# Patient Record
Sex: FEMALE | Race: WHITE | NOT HISPANIC OR LATINO | Employment: FULL TIME | URBAN - METROPOLITAN AREA
[De-identification: names, ages, dates, MRNs, and addresses within clinical notes are randomized per-mention and may not be internally consistent; named-entity substitution may affect disease eponyms.]

---

## 2023-04-15 LAB
CHLAMYDIA TRACH., AMPLIFIED: NEGATIVE
N. GONORRHEA, AMPLIFIED: NEGATIVE
TRICHOMONAS VAGINALIS: NEGATIVE

## 2023-09-07 PROBLEM — T45.1X5A ADVERSE EFFECT OF CHEMOTHERAPY: Status: ACTIVE | Noted: 2023-09-07

## 2023-09-07 PROBLEM — H60.90 OTITIS EXTERNA: Status: ACTIVE | Noted: 2023-09-07

## 2023-09-07 PROBLEM — R11.2 NAUSEA AND VOMITING: Status: ACTIVE | Noted: 2023-09-07

## 2023-09-07 PROBLEM — T50.8X5A: Status: ACTIVE | Noted: 2019-09-16

## 2023-09-07 PROBLEM — C20 PRIMARY MALIGNANT NEOPLASM OF RECTUM (MULTI): Status: ACTIVE | Noted: 2023-09-07

## 2023-09-07 PROBLEM — R91.8 INFILTRATE OF LUNG PRESENT ON IMAGING STUDY: Status: ACTIVE | Noted: 2023-09-07

## 2023-09-07 PROBLEM — B34.9 NONSPECIFIC SYNDROME SUGGESTIVE OF VIRAL ILLNESS: Status: ACTIVE | Noted: 2023-09-07

## 2023-09-07 PROBLEM — K21.9 ACID REFLUX: Status: ACTIVE | Noted: 2023-09-07

## 2023-09-07 PROBLEM — R53.83 FATIGUE: Status: ACTIVE | Noted: 2023-09-07

## 2023-09-07 PROBLEM — C78.02 MALIGNANT NEOPLASM METASTATIC TO LEFT LUNG (MULTI): Status: ACTIVE | Noted: 2023-09-07

## 2023-09-07 PROBLEM — R91.8 MASS OF LEFT LUNG: Status: ACTIVE | Noted: 2023-09-07

## 2023-09-07 PROBLEM — R10.9 ABDOMINAL PAIN: Status: ACTIVE | Noted: 2023-09-07

## 2023-09-07 PROBLEM — K59.00 CONSTIPATION: Status: ACTIVE | Noted: 2023-09-07

## 2023-09-07 PROBLEM — L25.9 CONTACT DERMATITIS: Status: ACTIVE | Noted: 2023-09-07

## 2023-09-07 PROBLEM — R07.89 CHEST WALL PAIN: Status: ACTIVE | Noted: 2023-09-07

## 2023-09-07 PROBLEM — C78.00 RECTAL CANCER METASTASIZED TO LUNG (MULTI): Status: ACTIVE | Noted: 2023-09-07

## 2023-09-07 PROBLEM — T78.40XA ALLERGIC REACTION: Status: ACTIVE | Noted: 2023-09-07

## 2023-09-07 PROBLEM — Z85.048 HISTORY OF MALIGNANT NEOPLASM OF ANUS: Status: ACTIVE | Noted: 2019-09-16

## 2023-09-07 PROBLEM — K62.5 RECTAL BLEEDING: Status: ACTIVE | Noted: 2023-09-07

## 2023-09-07 PROBLEM — Z85.9 HISTORY OF MALIGNANT NEOPLASM: Status: ACTIVE | Noted: 2019-09-16

## 2023-09-07 PROBLEM — R63.2 POLYPHAGIA: Status: ACTIVE | Noted: 2023-09-07

## 2023-09-07 PROBLEM — C20 RECTAL CANCER METASTASIZED TO LUNG (MULTI): Status: ACTIVE | Noted: 2023-09-07

## 2023-09-07 PROBLEM — Z72.4 INAPPROPRIATE DIET AND EATING HABITS: Status: ACTIVE | Noted: 2023-09-07

## 2023-09-07 PROBLEM — E66.01 MORBID OBESITY (MULTI): Status: ACTIVE | Noted: 2019-09-16

## 2023-09-07 PROBLEM — T82.598A: Status: ACTIVE | Noted: 2019-11-13

## 2023-09-07 PROBLEM — J02.9 SORE THROAT: Status: ACTIVE | Noted: 2023-09-07

## 2023-09-07 PROBLEM — R10.12 LEFT UPPER QUADRANT PAIN: Status: ACTIVE | Noted: 2023-09-07

## 2023-09-07 PROBLEM — B34.9 VIRAL ILLNESS: Status: ACTIVE | Noted: 2023-09-07

## 2023-09-07 RX ORDER — OMEPRAZOLE 40 MG/1
1 CAPSULE, DELAYED RELEASE ORAL
COMMUNITY
Start: 2021-02-23

## 2023-09-07 RX ORDER — DICYCLOMINE HYDROCHLORIDE 10 MG/1
1 CAPSULE ORAL EVERY 6 HOURS PRN
COMMUNITY
Start: 2022-07-14

## 2023-09-07 RX ORDER — ASPIRIN 325 MG
TABLET, DELAYED RELEASE (ENTERIC COATED) ORAL
COMMUNITY

## 2023-09-07 RX ORDER — ASCORBIC ACID 250 MG
1 TABLET ORAL DAILY
COMMUNITY

## 2023-09-07 RX ORDER — OMEPRAZOLE 20 MG/1
20 TABLET, DELAYED RELEASE ORAL
COMMUNITY

## 2023-09-07 RX ORDER — DIPHENHYDRAMINE HCL 25 MG
CAPSULE ORAL
COMMUNITY
Start: 2022-11-04

## 2023-09-07 RX ORDER — HYDROCORTISONE 10 MG/G
CREAM TOPICAL 2 TIMES DAILY
COMMUNITY
Start: 2022-05-26

## 2023-09-07 RX ORDER — LEVONORGESTREL 52 MG/1
INTRAUTERINE DEVICE INTRAUTERINE
COMMUNITY
Start: 2019-12-18

## 2023-10-03 ENCOUNTER — HOSPITAL ENCOUNTER (OUTPATIENT)
Dept: RADIOLOGY | Facility: HOSPITAL | Age: 28
Discharge: HOME | End: 2023-10-03
Payer: COMMERCIAL

## 2023-10-03 ENCOUNTER — ANCILLARY PROCEDURE (OUTPATIENT)
Dept: RADIOLOGY | Facility: CLINIC | Age: 28
End: 2023-10-03
Payer: COMMERCIAL

## 2023-10-03 DIAGNOSIS — C20 MALIGNANT NEOPLASM OF RECTUM (MULTI): ICD-10-CM

## 2023-10-03 PROCEDURE — 74177 CT ABD & PELVIS W/CONTRAST: CPT

## 2023-10-03 PROCEDURE — 74177 CT ABD & PELVIS W/CONTRAST: CPT | Performed by: STUDENT IN AN ORGANIZED HEALTH CARE EDUCATION/TRAINING PROGRAM

## 2023-10-03 PROCEDURE — 2550000001 HC RX 255 CONTRASTS: Performed by: RADIOLOGY

## 2023-10-03 PROCEDURE — 71260 CT THORAX DX C+: CPT | Performed by: STUDENT IN AN ORGANIZED HEALTH CARE EDUCATION/TRAINING PROGRAM

## 2023-10-03 PROCEDURE — 71260 CT THORAX DX C+: CPT

## 2023-10-03 RX ORDER — HEPARIN 100 UNIT/ML
100 SYRINGE INTRAVENOUS AS NEEDED
Status: DISCONTINUED | OUTPATIENT
Start: 2023-10-03 | End: 2023-10-20 | Stop reason: HOSPADM

## 2023-10-03 RX ORDER — SODIUM CHLORIDE 0.9 % (FLUSH) 0.9 %
10 SYRINGE (ML) INJECTION EVERY 12 HOURS SCHEDULED
Status: DISCONTINUED | OUTPATIENT
Start: 2023-10-03 | End: 2023-10-20 | Stop reason: HOSPADM

## 2023-10-03 RX ORDER — SODIUM CHLORIDE 0.9 % (FLUSH) 0.9 %
10 SYRINGE (ML) INJECTION AS NEEDED
Status: DISCONTINUED | OUTPATIENT
Start: 2023-10-03 | End: 2023-10-20 | Stop reason: HOSPADM

## 2023-10-03 RX ADMIN — IOHEXOL 75 ML: 350 INJECTION, SOLUTION INTRAVENOUS at 11:25

## 2023-10-12 ENCOUNTER — OFFICE VISIT (OUTPATIENT)
Dept: HEMATOLOGY/ONCOLOGY | Facility: HOSPITAL | Age: 28
End: 2023-10-12
Payer: COMMERCIAL

## 2023-10-12 VITALS
HEIGHT: 66 IN | DIASTOLIC BLOOD PRESSURE: 75 MMHG | RESPIRATION RATE: 20 BRPM | TEMPERATURE: 97.9 F | SYSTOLIC BLOOD PRESSURE: 138 MMHG | OXYGEN SATURATION: 100 % | WEIGHT: 293 LBS | HEART RATE: 74 BPM | BODY MASS INDEX: 47.09 KG/M2

## 2023-10-12 DIAGNOSIS — C78.00 RECTAL CANCER METASTASIZED TO LUNG (MULTI): Primary | ICD-10-CM

## 2023-10-12 DIAGNOSIS — C20 RECTAL CANCER METASTASIZED TO LUNG (MULTI): Primary | ICD-10-CM

## 2023-10-12 PROCEDURE — 99213 OFFICE O/P EST LOW 20 MIN: CPT | Performed by: INTERNAL MEDICINE

## 2023-10-12 PROCEDURE — 3008F BODY MASS INDEX DOCD: CPT | Performed by: INTERNAL MEDICINE

## 2023-10-12 PROCEDURE — 1036F TOBACCO NON-USER: CPT | Performed by: INTERNAL MEDICINE

## 2023-10-12 ASSESSMENT — ENCOUNTER SYMPTOMS
OCCASIONAL FEELINGS OF UNSTEADINESS: 0
LOSS OF SENSATION IN FEET: 0
DEPRESSION: 0

## 2023-10-12 ASSESSMENT — PAIN SCALES - GENERAL: PAINLEVEL: 0-NO PAIN

## 2023-10-12 NOTE — PROGRESS NOTES
Patient ID: Elizabeth Wolff is a 27 y.o. female from [place], [place].     Referring Physician: No referring provider defined for this encounter.    Primary Care Provider: No Assigned PCP Generic Provider, MD    Cancer History:          Colon and Rectum         AJCC Edition: 8th (AJCC), Diagnosis Date: Jun 2018, ALAN, T3 N1 M1a      Treatment Synopsis:    At 22-years of age she was  referred to [facility] for evaluation of rectal bleeding for  1 month. . She went to emergency room with  a rectal exam was done and she was told she has small external hemorrhoids. She did have significant perianal discomfort. She had a flex/sig, with Dr. Lozano, on 6/29/18, that showed: A fungating non-obstructing medium-sized mass was found in the proximal  rectum. The mass was non-circumferential. The mass measured seven cm in length. No bleeding was present.   MRI described a lesion in the mid rectum with angela involvement suspected.   CTs done revealed pulmonary nodules with bx done 8/7/18 confirming metastatic disease.  MS- Stable   NGS  RESULT: Positive for KRAS mutation. Negative for NRAS mutation. Negative for BRAF mutation. VARIANTS DETECTED: KRAS p.G12D (c.35G>A) TP53 p.V272E (c.815T>A) INTERPRETATION: KRAS p.G12D Gain-of-function   FDA/NCCN RECOMMENDATION: Resistance to Panitumumab and Cetuximab CLINICAL TRIALS: Ribociclib + Trametinib ([id number]); Trametinib + Navitoclax ([id number])      CEA was done and was normal      FAMILY HISTORY  MGM with breast cancer in 60s  MGGF MA - colon cancer -- youngest age 56  Sister- bx pending for poss NHL.  Paternal history negative (poor knowledge base)  Distant maternal side with multiple cancers: pancreatic      She is heterozygous for Rad50 gene  *PALB2 Variant of Uncertain Significance (VUS)*:     Treatment History:          2018-8-15: Chemotherapy: FolFox Cycle #1     2018-11-30: Chemotherapy: Completion of 8 Folfox     2019-2-4: Chemotherapy: FolFox restarted    "  2019-2-18: Chemotherapy: Treatment stopped per patient     2020-1-6: Chemotherapy: CapOx started with disease progression in lung     2020-1-13: Chemotherapy: CapOx stopped with poor tolerability     2020-2-6: Chemotherapy: Folfox started with oxaliplatin reaction noted     2020-4-2: Chemotherapy: Completion of 6 cycles Folfox       Past Medical History: Elizabeth has no past medical history on file.  Surgical History:  Elizabeth has a past surgical history that includes Other surgical history (04/27/2020); Other surgical history (08/17/2020); and Other surgical history (08/17/2020).  Social History:  Elizabeth reports that she has never smoked. She has never been exposed to tobacco smoke. She has never used smokeless tobacco. She reports that she does not currently use alcohol.  Family History:    Family History   Problem Relation Name Age of Onset    Colon cancer Mother's Brother      Skin cancer Maternal Grandfather      Breast cancer Other Maternal Relatives     Colon cancer Other Maternal Relatives     Pancreatic cancer Other Maternal Relatives      Family Oncology History:  Cancer-related family history includes Breast cancer in an other family member; Colon cancer in her mother's brother and another family member; Pancreatic cancer in an other family member; Skin cancer in her maternal grandfather.        SUBJECTIVE:    History of Present Illness:  Elizabeth Wolff is a 27 y.o. female who was referred by No ref. 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         OBJECTIVE:    VS / Pain:  /75   Pulse 74   Temp 36.6 °C (97.9 °F) (Core)   Resp 20   Ht (S) 1.665 m (5' 5.55\")   Wt 142 kg (313 lb 7.9 oz)   SpO2 100%   BMI 51.29 kg/m²   BSA: 2.56 meters squared   Pain Scale: 0    Physical Exam  Constitutional:       Appearance: Normal appearance.   HENT:      Nose: Nose normal.      Mouth/Throat:      Mouth: Mucous membranes are " moist.   Eyes:      Extraocular Movements: Extraocular movements intact.      Conjunctiva/sclera: Conjunctivae normal.   Neck:      Comments: Skin over mediport is thin.  Hx of previous mediport erosion.  Cardiovascular:      Rate and Rhythm: Normal rate.   Musculoskeletal:         General: Normal range of motion.      Cervical back: Normal range of motion.   Skin:     General: Skin is warm.   Neurological:      General: No focal deficit present.         Performance Status:       Diagnostic Results         WBC   Date/Time Value Ref Range Status   08/29/2023 09:01 AM CANCELED       Comment:     Result canceled by the ancillary.   04/24/2023 01:29 PM 10.1 4.4 - 11.3 x10E9/L Final   04/06/2023 09:14 AM 9.2 4.4 - 11.3 x10E9/L Final     Hemoglobin   Date Value Ref Range Status   08/29/2023 CANCELED       Comment:     Result canceled by the ancillary.   04/24/2023 12.9 12.0 - 16.0 g/dL Final   04/06/2023 12.4 12.0 - 16.0 g/dL Final     MCV   Date/Time Value Ref Range Status   08/29/2023 09:01 AM CANCELED       Comment:     Result canceled by the ancillary.   04/24/2023 01:29 PM 82 80 - 100 fL Final   04/06/2023 09:14 AM 81 80 - 100 fL Final     Platelets   Date/Time Value Ref Range Status   08/29/2023 09:01 AM CANCELED       Comment:     Result canceled by the ancillary.   04/24/2023 01:29  150 - 450 x10E9/L Final   04/06/2023 09:14  150 - 450 x10E9/L Final     Neutrophils Absolute   Date/Time Value Ref Range Status   08/29/2023 09:01 AM CANCELED       Comment:      Percent differential counts (%) should be interpreted in the   context of the absolute cell counts (cells/L).    Result canceled by the ancillary.     04/24/2023 01:29 PM 6.71 1.20 - 7.70 x10E9/L Final     Comment:      Percent differential counts (%) should be interpreted in the   context of the absolute cell counts (cells/L).     04/06/2023 09:14 AM 5.25 1.20 - 7.70 x10E9/L Final     Total Bilirubin   Date/Time Value Ref Range Status   08/29/2023  "10:48 AM CANCELED       Comment:     Result canceled by the ancillary.   04/24/2023 01:29 PM 0.3 0.0 - 1.2 mg/dL Final   04/06/2023 09:14 AM 0.4 0.0 - 1.2 mg/dL Final     AST   Date/Time Value Ref Range Status   08/29/2023 10:48 AM CANCELED       Comment:     Result canceled by the ancillary.   04/24/2023 01:29 PM 11 9 - 39 U/L Final   04/06/2023 09:14 AM 10 9 - 39 U/L Final     ALT (SGPT)   Date/Time Value Ref Range Status   08/29/2023 10:48 AM CANCELED       Comment:      Patients treated with Sulfasalazine may generate    falsely decreased results for ALT.    Result canceled by the ancillary.     04/24/2023 01:29 PM 10 7 - 45 U/L Final     Comment:      Patients treated with Sulfasalazine may generate    falsely decreased results for ALT.     04/06/2023 09:14 AM 10 7 - 45 U/L Final     Comment:      Patients treated with Sulfasalazine may generate    falsely decreased results for ALT.       Creatinine   Date/Time Value Ref Range Status   08/29/2023 10:48 AM CANCELED       Comment:     Result canceled by the ancillary.   04/24/2023 01:29 PM 0.52 0.50 - 1.05 mg/dL Final   04/06/2023 09:14 AM 0.63 0.50 - 1.05 mg/dL Final     Urea Nitrogen   Date/Time Value Ref Range Status   08/29/2023 10:48 AM CANCELED       Comment:     Result canceled by the ancillary.   04/24/2023 01:29 PM 15 6 - 23 mg/dL Final   04/06/2023 09:14 AM 13 6 - 23 mg/dL Final     Albumin   Date/Time Value Ref Range Status   08/29/2023 10:48 AM CANCELED       Comment:     Result canceled by the ancillary.   04/24/2023 01:29 PM 4.0 3.4 - 5.0 g/dL Final   04/06/2023 09:14 AM 3.9 3.4 - 5.0 g/dL Final     No results found for: \"\"  No results found for: \"CEA\"      Imaging:  10/3/23      IMPRESSION:  CHEST:  1. Status post left upper lobe resection with no locoregional  recurrence or pulmonary metastasis.  2. No intrathoracic enlarged lymphadenopathy.  3. Of note previously described nonspecific filling defect within the  right lower lobe pulmonary " "segmental artery branch is not visualized  in today's study.      ABDOMEN-PELVIS:  1. No metastatic disease in the abdomen or pelvis.        Assessment/Plan   Assessment and Plan:   Assessment:    1. Stage IV rectal cancer:        presenting with lung nodules and a rectal mass. She had complete response after 9 infusions of FOLFOX with no rectal mass identified by endoscopy. November 2019, she had growth of tumor in the left upper lobe and chemo was restarted. She was intolerant  of CapOX but completed 6 infusions of FOLFOX. Of note, she had an oxaliplatin reaction and requires a prolonged infusion time.     She did have a bronchoscopy and thoracotomy on the left side in July 2020 after 15 rounds of chemotherapy.      2020 endoscopy showing only \"scar\" at site of prior tumor. No bx was taken. She is therefor PAT and i would strongly advocate for close attention to the primary tumor site as she is most at risk for local recurrence.      Of note, imaging for her is likely to be complicated as she has had significant reactions to CT dye even with the addition of premeds. She described her last reaction as just feeling \"tingly\"   She also has a history of a reaction to MRI dye though not premedicated. MRI event-- respiratory with throat symptoms.     Recall she is SHERIDAN, Kras mutated (NRAS unmutated) and noted to have RAD50 gene.  Discussed clonoseq type testing vs Signatera to evaluate for any occult evidence of circulating tumor. Will follow with current testing and with any abnormalities this will be done     MRI rectum in 2/2023 was negative.  Flex sig negative in 2/2023      Plan:   Rectal cancer:  PAT  -next Flex sig 9/2024  -CT every 6 mo for now until at least 2025, next is due in 4/2023.  Obtain over the next month and follow up.  - Negative Signatera 9/2023, repeat Q 3 months x 2 years.  - Continue port flushes at [place]      2. [facility] care:  Ongoing care. Some flow issue noted.      3. Premature " menopausal symptoms:   She has a Mirena in place and now without regular menstrual cycles. Some concern about her breast cancer risk with RAD50 gene. Will be attentive to yearly screening. Has follow up yearly with GYN in [place] 4. obesity:  This is an ongoing issue and somewhat limits exam. May also put her at increased risk from cancer perspective.        5. Genetics  Rad50 mutation noted-- may predict risk for Colon, Breast and ovarian.  Dagoberto Platt MD

## 2023-10-31 ENCOUNTER — APPOINTMENT (OUTPATIENT)
Dept: RADIOLOGY | Facility: HOSPITAL | Age: 28
End: 2023-10-31
Payer: COMMERCIAL

## 2023-10-31 PROCEDURE — 99284 EMERGENCY DEPT VISIT MOD MDM: CPT | Mod: 25 | Performed by: EMERGENCY MEDICINE

## 2023-10-31 PROCEDURE — 96374 THER/PROPH/DIAG INJ IV PUSH: CPT

## 2023-10-31 PROCEDURE — 71046 X-RAY EXAM CHEST 2 VIEWS: CPT

## 2023-10-31 PROCEDURE — 96375 TX/PRO/DX INJ NEW DRUG ADDON: CPT

## 2023-10-31 PROCEDURE — 71046 X-RAY EXAM CHEST 2 VIEWS: CPT | Performed by: RADIOLOGY

## 2023-10-31 ASSESSMENT — COLUMBIA-SUICIDE SEVERITY RATING SCALE - C-SSRS
6. HAVE YOU EVER DONE ANYTHING, STARTED TO DO ANYTHING, OR PREPARED TO DO ANYTHING TO END YOUR LIFE?: NO
1. IN THE PAST MONTH, HAVE YOU WISHED YOU WERE DEAD OR WISHED YOU COULD GO TO SLEEP AND NOT WAKE UP?: NO
2. HAVE YOU ACTUALLY HAD ANY THOUGHTS OF KILLING YOURSELF?: NO

## 2023-11-01 ENCOUNTER — HOSPITAL ENCOUNTER (EMERGENCY)
Facility: HOSPITAL | Age: 28
Discharge: HOME | End: 2023-11-01
Attending: EMERGENCY MEDICINE
Payer: COMMERCIAL

## 2023-11-01 VITALS
OXYGEN SATURATION: 96 % | HEART RATE: 85 BPM | DIASTOLIC BLOOD PRESSURE: 85 MMHG | RESPIRATION RATE: 20 BRPM | HEIGHT: 66 IN | BODY MASS INDEX: 47.09 KG/M2 | WEIGHT: 293 LBS | SYSTOLIC BLOOD PRESSURE: 126 MMHG | TEMPERATURE: 98.2 F

## 2023-11-01 DIAGNOSIS — J40 BRONCHITIS: Primary | ICD-10-CM

## 2023-11-01 LAB
ALBUMIN SERPL BCP-MCNC: 4.3 G/DL (ref 3.4–5)
ALP SERPL-CCNC: 83 U/L (ref 33–110)
ALT SERPL W P-5'-P-CCNC: 13 U/L (ref 7–45)
ANION GAP SERPL CALC-SCNC: 13 MMOL/L (ref 10–20)
AST SERPL W P-5'-P-CCNC: 15 U/L (ref 9–39)
BASOPHILS # BLD AUTO: 0.06 X10*3/UL (ref 0–0.1)
BASOPHILS NFR BLD AUTO: 0.5 %
BILIRUB SERPL-MCNC: 0.4 MG/DL (ref 0–1.2)
BUN SERPL-MCNC: 11 MG/DL (ref 6–23)
CALCIUM SERPL-MCNC: 9 MG/DL (ref 8.6–10.3)
CHLORIDE SERPL-SCNC: 106 MMOL/L (ref 98–107)
CO2 SERPL-SCNC: 23 MMOL/L (ref 21–32)
CREAT SERPL-MCNC: 0.7 MG/DL (ref 0.5–1.05)
D DIMER PPP FEU-MCNC: 373 NG/ML FEU
EOSINOPHIL # BLD AUTO: 0.15 X10*3/UL (ref 0–0.7)
EOSINOPHIL NFR BLD AUTO: 1.3 %
ERYTHROCYTE [DISTWIDTH] IN BLOOD BY AUTOMATED COUNT: 13.6 % (ref 11.5–14.5)
FLUAV RNA RESP QL NAA+PROBE: NOT DETECTED
FLUBV RNA RESP QL NAA+PROBE: NOT DETECTED
GFR SERPL CREATININE-BSD FRML MDRD: >90 ML/MIN/1.73M*2
GLUCOSE SERPL-MCNC: 93 MG/DL (ref 74–99)
HCT VFR BLD AUTO: 40 % (ref 36–46)
HGB BLD-MCNC: 13.2 G/DL (ref 12–16)
IMM GRANULOCYTES # BLD AUTO: 0.05 X10*3/UL (ref 0–0.7)
IMM GRANULOCYTES NFR BLD AUTO: 0.4 % (ref 0–0.9)
INR PPP: 1.1 (ref 0.9–1.1)
LYMPHOCYTES # BLD AUTO: 4.65 X10*3/UL (ref 1.2–4.8)
LYMPHOCYTES NFR BLD AUTO: 41.1 %
MCH RBC QN AUTO: 27.2 PG (ref 26–34)
MCHC RBC AUTO-ENTMCNC: 33 G/DL (ref 32–36)
MCV RBC AUTO: 82 FL (ref 80–100)
MONOCYTES # BLD AUTO: 0.51 X10*3/UL (ref 0.1–1)
MONOCYTES NFR BLD AUTO: 4.5 %
NEUTROPHILS # BLD AUTO: 5.9 X10*3/UL (ref 1.2–7.7)
NEUTROPHILS NFR BLD AUTO: 52.2 %
NRBC BLD-RTO: 0 /100 WBCS (ref 0–0)
PLATELET # BLD AUTO: 278 X10*3/UL (ref 150–450)
PMV BLD AUTO: 9.7 FL (ref 7.5–11.5)
POTASSIUM SERPL-SCNC: 3.9 MMOL/L (ref 3.5–5.3)
PROT SERPL-MCNC: 7.4 G/DL (ref 6.4–8.2)
PROTHROMBIN TIME: 12.3 SECONDS (ref 9.8–12.8)
RBC # BLD AUTO: 4.86 X10*6/UL (ref 4–5.2)
SARS-COV-2 RNA RESP QL NAA+PROBE: NOT DETECTED
SODIUM SERPL-SCNC: 138 MMOL/L (ref 136–145)
WBC # BLD AUTO: 11.3 X10*3/UL (ref 4.4–11.3)

## 2023-11-01 PROCEDURE — 85379 FIBRIN DEGRADATION QUANT: CPT | Performed by: EMERGENCY MEDICINE

## 2023-11-01 PROCEDURE — 2500000004 HC RX 250 GENERAL PHARMACY W/ HCPCS (ALT 636 FOR OP/ED): Performed by: EMERGENCY MEDICINE

## 2023-11-01 PROCEDURE — 82247 BILIRUBIN TOTAL: CPT | Performed by: EMERGENCY MEDICINE

## 2023-11-01 PROCEDURE — 80053 COMPREHEN METABOLIC PANEL: CPT | Performed by: EMERGENCY MEDICINE

## 2023-11-01 PROCEDURE — 87636 SARSCOV2 & INF A&B AMP PRB: CPT | Performed by: EMERGENCY MEDICINE

## 2023-11-01 PROCEDURE — 85610 PROTHROMBIN TIME: CPT | Performed by: EMERGENCY MEDICINE

## 2023-11-01 PROCEDURE — 85025 COMPLETE CBC W/AUTO DIFF WBC: CPT | Performed by: EMERGENCY MEDICINE

## 2023-11-01 RX ORDER — FAMOTIDINE 10 MG/ML
20 INJECTION INTRAVENOUS ONCE
Status: COMPLETED | OUTPATIENT
Start: 2023-11-01 | End: 2023-11-01

## 2023-11-01 RX ORDER — DIPHENHYDRAMINE HYDROCHLORIDE 50 MG/ML
50 INJECTION INTRAMUSCULAR; INTRAVENOUS ONCE
Status: COMPLETED | OUTPATIENT
Start: 2023-11-01 | End: 2023-11-01

## 2023-11-01 RX ORDER — AZITHROMYCIN 250 MG/1
250 TABLET, FILM COATED ORAL DAILY
Qty: 6 TABLET | Refills: 0 | Status: SHIPPED | OUTPATIENT
Start: 2023-11-01 | End: 2023-11-06

## 2023-11-01 RX ORDER — HEPARIN 100 UNIT/ML
SYRINGE INTRAVENOUS
Status: DISCONTINUED
Start: 2023-11-01 | End: 2023-11-01 | Stop reason: HOSPADM

## 2023-11-01 RX ADMIN — DIPHENHYDRAMINE HYDROCHLORIDE 50 MG: 50 INJECTION, SOLUTION INTRAMUSCULAR; INTRAVENOUS at 03:28

## 2023-11-01 RX ADMIN — FAMOTIDINE 20 MG: 10 INJECTION, SOLUTION INTRAVENOUS at 03:27

## 2023-11-01 RX ADMIN — METHYLPREDNISOLONE SODIUM SUCCINATE 125 MG: 125 INJECTION, POWDER, FOR SOLUTION INTRAMUSCULAR; INTRAVENOUS at 03:27

## 2023-11-01 ASSESSMENT — PAIN SCALES - GENERAL: PAINLEVEL_OUTOF10: 0 - NO PAIN

## 2023-11-01 NOTE — ED PROVIDER NOTES
HPI   Chief Complaint   Patient presents with    Coughing Up Blood     Sinus infection and productive cough for approx 9 days. Pt states she noticed today she was coughing up some bright red blood. Hx lobectomy in 2020. Got back from [facility] on 10/23       HPI     HISTORY OF PRESENT ILLNESS:  Patient is a 27-year-old with history of prior DVT and rectal cancer currently in remission who presents to the emergency department for hemoptysis and cough.  Patient has had cough for now 9 days.  It was initially nonproductive..  She noted that she had hemoptysis today.  She has not had any chest pain associated with this.  Patient denies any leg swelling.   Patient does have a prior history of a blood clots remotely but is no longer on blood thinners. She is not on birth control    Past Medical History: Stage IV rectal cancer with metastases to lung status post lobectomy, currently in remission, prior blood clot no longer on blood thinners  Past Surgical History: Significant for lobectomy  Family History: family history not pertinent to presenting problem or chief complaint  Social History: Denies cigarette smoking kind.  She is conscious of drugs    __________________________________________________________  PHYSICAL EXAM:    Appearance: Nontoxic-appearing  female, appears stated age.  Alert, oriented , cooperative   Skin: Intact,  dry skin, no lesions, rash, petechiae or purpura.   Eyes: PERRLA, EOMs intact,  Conjunctiva pink with no redness or exudates.    HENT: Normocephalic, atraumatic. Nares patent   Neck: Supple. Trachea at midline.   Pulmonary: Lung sounds are clear bilaterally.  There is no rales, rhonchi, or wheezing.  Cardiac: Regular rate and rhythm, no rubs, murmurs, or gallops. No JVD,   Abdomen: Abdomen is soft, nontender, and nondistended.  No palpable organomegaly.  No rebound or guarding.  No CVA tenderness. Nonsurgical abdomen  Genitourinary: Exam deferred.  Musculoskeletal: no edema, pain,  cyanosis, or deformity in extremities. Pulses full and equal.   Neurological:  Cranial nerves are grossly intact, grossly normal sensation, no weakness, no focal findings identified.    __________________________________________________________  MEDICAL DECISION MAKING:    Patient was seen and examined. Differential diagnosis for sinusitis and cough described as hemoptysis includes pneumonia, bronchitis, URI symptoms, pulmonary embolism. Patient vital signs are within normal limits with no evidence of hypoxia. Patient is Wells score of 2.5, moderate with reported hemoptysis and prior history of thrombosis.  Note that cancer is not active at this time and does not contribute to her Wells score.  Chest x-ray had ready been shot and negative.  I will obtain labs to evaluate for infection in addition to flu, COVID, and D-dimer test.  I feel that a D-dimer is relevant at this moment given low index of suspicion.  Patient was medicated for a potential IV contrast allergy for the CT PE study. Negative D-dimer.  Chest x-ray was also negative for a pneumonia.  Felt like her cough was related to her URI symptoms.  Patient ambulating pulse ox was normal at greater than 96%.  Patient at this point given her Wells score with a negative D-dimer was that this was suspicion to rule out for pulmonary embolism.  Patient was provided azithromycin prescription.  Patient advised close primary care follow-up and return precautions.  Patient verbalized understanding, agreed plan, and patient was discharged home.    Patient work-up was completely negative.  This included    [facility] [place]  Emergency Medicine               No data recorded                Patient History   No past medical history on file.  Past Surgical History:   Procedure Laterality Date    OTHER SURGICAL HISTORY  04/27/2020    Sigmoidoscopy    OTHER SURGICAL HISTORY  08/17/2020    Lung lobectomy    OTHER SURGICAL HISTORY  08/17/2020    Thoracotomy     Family History    Problem Relation Name Age of Onset    Colon cancer Mother's Brother      Skin cancer Maternal Grandfather      Breast cancer Other Maternal Relatives     Colon cancer Other Maternal Relatives     Pancreatic cancer Other Maternal Relatives      Social History     Tobacco Use    Smoking status: Never     Passive exposure: Never    Smokeless tobacco: Never   Substance Use Topics    Alcohol use: Not Currently    Drug use: Not on file       Physical Exam   ED Triage Vitals [10/31/23 2252]   Temp Heart Rate Resp BP   36.6 °C (97.8 °F) 85 18 143/82      SpO2 Temp src Heart Rate Source Patient Position   100 % -- -- --      BP Location FiO2 (%)     -- --       Physical Exam    ED Course & MDM   ED Course as of 11/01/23 0536   Wed Nov 01, 2023   0352 D-Dimer, Quantitative VTE Exclusion: 373 [WJ]   0352 HEMOGLOBIN: 13.2 [WJ]   0536 Ambulate 98% [WJ]      ED Course User Index  [WJ] Max Park DO         Diagnoses as of 11/01/23 0536   Bronchitis       Medical Decision Making      Procedure  Procedures     Max Park DO  11/01/23 6517

## 2024-01-04 ENCOUNTER — LAB (OUTPATIENT)
Dept: LAB | Facility: LAB | Age: 29
End: 2024-01-04
Payer: COMMERCIAL

## 2024-01-04 DIAGNOSIS — C78.00 RECTAL CANCER METASTASIZED TO LUNG (MULTI): ICD-10-CM

## 2024-01-04 DIAGNOSIS — C20 RECTAL CANCER METASTASIZED TO LUNG (MULTI): ICD-10-CM

## 2024-01-04 LAB
ALBUMIN SERPL BCP-MCNC: 4 G/DL (ref 3.4–5)
ALP SERPL-CCNC: 79 U/L (ref 33–110)
ALT SERPL W P-5'-P-CCNC: 11 U/L (ref 7–45)
ANION GAP SERPL CALC-SCNC: 10 MMOL/L (ref 10–20)
AST SERPL W P-5'-P-CCNC: 12 U/L (ref 9–39)
BASOPHILS # BLD AUTO: 0.05 X10*3/UL (ref 0–0.1)
BASOPHILS NFR BLD AUTO: 0.6 %
BILIRUB SERPL-MCNC: 0.4 MG/DL (ref 0–1.2)
BUN SERPL-MCNC: 11 MG/DL (ref 6–23)
CALCIUM SERPL-MCNC: 9.1 MG/DL (ref 8.6–10.3)
CEA SERPL-MCNC: <0.5 UG/L
CHLORIDE SERPL-SCNC: 107 MMOL/L (ref 98–107)
CO2 SERPL-SCNC: 27 MMOL/L (ref 21–32)
CREAT SERPL-MCNC: 0.65 MG/DL (ref 0.5–1.05)
EOSINOPHIL # BLD AUTO: 0.11 X10*3/UL (ref 0–0.7)
EOSINOPHIL NFR BLD AUTO: 1.2 %
ERYTHROCYTE [DISTWIDTH] IN BLOOD BY AUTOMATED COUNT: 13.5 % (ref 11.5–14.5)
GFR SERPL CREATININE-BSD FRML MDRD: >90 ML/MIN/1.73M*2
GLUCOSE SERPL-MCNC: 110 MG/DL (ref 74–99)
HCT VFR BLD AUTO: 40.7 % (ref 36–46)
HGB BLD-MCNC: 13 G/DL (ref 12–16)
IMM GRANULOCYTES # BLD AUTO: 0.03 X10*3/UL (ref 0–0.7)
IMM GRANULOCYTES NFR BLD AUTO: 0.3 % (ref 0–0.9)
LYMPHOCYTES # BLD AUTO: 2.63 X10*3/UL (ref 1.2–4.8)
LYMPHOCYTES NFR BLD AUTO: 29.2 %
MCH RBC QN AUTO: 26.5 PG (ref 26–34)
MCHC RBC AUTO-ENTMCNC: 31.9 G/DL (ref 32–36)
MCV RBC AUTO: 83 FL (ref 80–100)
MONOCYTES # BLD AUTO: 0.3 X10*3/UL (ref 0.1–1)
MONOCYTES NFR BLD AUTO: 3.3 %
NEUTROPHILS # BLD AUTO: 5.88 X10*3/UL (ref 1.2–7.7)
NEUTROPHILS NFR BLD AUTO: 65.4 %
NRBC BLD-RTO: 0 /100 WBCS (ref 0–0)
PLATELET # BLD AUTO: 255 X10*3/UL (ref 150–450)
POTASSIUM SERPL-SCNC: 3.9 MMOL/L (ref 3.5–5.3)
PROT SERPL-MCNC: 6.8 G/DL (ref 6.4–8.2)
RBC # BLD AUTO: 4.91 X10*6/UL (ref 4–5.2)
SODIUM SERPL-SCNC: 140 MMOL/L (ref 136–145)
WBC # BLD AUTO: 9 X10*3/UL (ref 4.4–11.3)

## 2024-01-04 PROCEDURE — 80053 COMPREHEN METABOLIC PANEL: CPT

## 2024-01-04 PROCEDURE — 82378 CARCINOEMBRYONIC ANTIGEN: CPT

## 2024-01-04 PROCEDURE — 85025 COMPLETE CBC W/AUTO DIFF WBC: CPT

## 2024-01-04 PROCEDURE — 36415 COLL VENOUS BLD VENIPUNCTURE: CPT

## 2024-01-17 LAB — SCAN RESULT: NORMAL

## 2024-04-04 ENCOUNTER — TELEPHONE (OUTPATIENT)
Dept: HEMATOLOGY/ONCOLOGY | Facility: CLINIC | Age: 29
End: 2024-04-04
Payer: COMMERCIAL

## 2024-04-04 DIAGNOSIS — C20 PRIMARY MALIGNANT NEOPLASM OF RECTUM (MULTI): Primary | ICD-10-CM

## 2024-04-04 NOTE — TELEPHONE ENCOUNTER
Attempted to contact patient to r/s appt and inform her she will need to schedule a CT prior. No mailbox set up. Will try again.

## 2024-04-09 ENCOUNTER — TELEPHONE (OUTPATIENT)
Dept: HEMATOLOGY/ONCOLOGY | Facility: CLINIC | Age: 29
End: 2024-04-09

## 2024-04-09 NOTE — TELEPHONE ENCOUNTER
Call placed to patient in regards to labs needed prior to CT scan. Patient stated that she will need to have a Creatinine drawn before the scan which labs have been entered previously on 4/4/24 which patient was made aware of.  Also pre-meds need to be sent to the local pharmacy for the patient's Iodine allergy. Message sent to the team with the following information. Patient made aware to return call to office if medications are not sent to her local pharmacy

## 2024-04-10 DIAGNOSIS — C78.02 MALIGNANT NEOPLASM METASTATIC TO LEFT LUNG (MULTI): Primary | ICD-10-CM

## 2024-04-10 RX ORDER — PREDNISONE 50 MG/1
50 TABLET ORAL 3 TIMES DAILY
Qty: 3 TABLET | Refills: 0 | Status: SHIPPED | OUTPATIENT
Start: 2024-04-10 | End: 2024-04-11

## 2024-04-16 ENCOUNTER — LAB (OUTPATIENT)
Dept: LAB | Facility: LAB | Age: 29
End: 2024-04-16
Payer: COMMERCIAL

## 2024-04-16 DIAGNOSIS — C20 PRIMARY MALIGNANT NEOPLASM OF RECTUM (MULTI): ICD-10-CM

## 2024-04-16 DIAGNOSIS — C20 MALIGNANT NEOPLASM OF RECTUM (MULTI): Primary | ICD-10-CM

## 2024-04-16 LAB
ALBUMIN SERPL BCP-MCNC: 4.3 G/DL (ref 3.4–5)
ALP SERPL-CCNC: 82 U/L (ref 33–110)
ALT SERPL W P-5'-P-CCNC: 14 U/L (ref 7–45)
ANION GAP SERPL CALC-SCNC: 11 MMOL/L (ref 10–20)
AST SERPL W P-5'-P-CCNC: 22 U/L (ref 9–39)
BASOPHILS # BLD AUTO: 0.07 X10*3/UL (ref 0–0.1)
BASOPHILS NFR BLD AUTO: 0.8 %
BILIRUB SERPL-MCNC: 0.4 MG/DL (ref 0–1.2)
BUN SERPL-MCNC: 14 MG/DL (ref 6–23)
CALCIUM SERPL-MCNC: 9.1 MG/DL (ref 8.6–10.3)
CHLORIDE SERPL-SCNC: 108 MMOL/L (ref 98–107)
CO2 SERPL-SCNC: 25 MMOL/L (ref 21–32)
CREAT SERPL-MCNC: 0.69 MG/DL (ref 0.5–1.05)
EGFRCR SERPLBLD CKD-EPI 2021: >90 ML/MIN/1.73M*2
EOSINOPHIL # BLD AUTO: 0.09 X10*3/UL (ref 0–0.7)
EOSINOPHIL NFR BLD AUTO: 1.1 %
ERYTHROCYTE [DISTWIDTH] IN BLOOD BY AUTOMATED COUNT: 13.8 % (ref 11.5–14.5)
GLUCOSE SERPL-MCNC: 83 MG/DL (ref 74–99)
HCT VFR BLD AUTO: 46.3 % (ref 36–46)
HGB BLD-MCNC: 14.7 G/DL (ref 12–16)
IMM GRANULOCYTES # BLD AUTO: 0.03 X10*3/UL (ref 0–0.7)
IMM GRANULOCYTES NFR BLD AUTO: 0.4 % (ref 0–0.9)
LYMPHOCYTES # BLD AUTO: 3.11 X10*3/UL (ref 1.2–4.8)
LYMPHOCYTES NFR BLD AUTO: 36.5 %
MCH RBC QN AUTO: 26.4 PG (ref 26–34)
MCHC RBC AUTO-ENTMCNC: 31.7 G/DL (ref 32–36)
MCV RBC AUTO: 83 FL (ref 80–100)
MONOCYTES # BLD AUTO: 0.33 X10*3/UL (ref 0.1–1)
MONOCYTES NFR BLD AUTO: 3.9 %
NEUTROPHILS # BLD AUTO: 4.9 X10*3/UL (ref 1.2–7.7)
NEUTROPHILS NFR BLD AUTO: 57.3 %
NRBC BLD-RTO: 0 /100 WBCS (ref 0–0)
PLATELET # BLD AUTO: 234 X10*3/UL (ref 150–450)
POTASSIUM SERPL-SCNC: 4.5 MMOL/L (ref 3.5–5.3)
PROT SERPL-MCNC: 7.5 G/DL (ref 6.4–8.2)
RBC # BLD AUTO: 5.56 X10*6/UL (ref 4–5.2)
SODIUM SERPL-SCNC: 139 MMOL/L (ref 136–145)
WBC # BLD AUTO: 8.5 X10*3/UL (ref 4.4–11.3)

## 2024-04-16 PROCEDURE — 85025 COMPLETE CBC W/AUTO DIFF WBC: CPT

## 2024-04-16 PROCEDURE — 80053 COMPREHEN METABOLIC PANEL: CPT

## 2024-04-16 PROCEDURE — 82378 CARCINOEMBRYONIC ANTIGEN: CPT

## 2024-04-16 PROCEDURE — 36415 COLL VENOUS BLD VENIPUNCTURE: CPT

## 2024-04-17 LAB — CEA SERPL-MCNC: <0.5 UG/L

## 2024-04-19 ENCOUNTER — HOSPITAL ENCOUNTER (OUTPATIENT)
Dept: RADIOLOGY | Facility: HOSPITAL | Age: 29
Discharge: HOME | End: 2024-04-19
Payer: COMMERCIAL

## 2024-04-19 ENCOUNTER — APPOINTMENT (OUTPATIENT)
Dept: RADIOLOGY | Facility: HOSPITAL | Age: 29
End: 2024-04-19
Payer: COMMERCIAL

## 2024-04-19 DIAGNOSIS — C20 PRIMARY MALIGNANT NEOPLASM OF RECTUM (MULTI): ICD-10-CM

## 2024-04-19 PROCEDURE — 2500000004 HC RX 250 GENERAL PHARMACY W/ HCPCS (ALT 636 FOR OP/ED): Performed by: RADIOLOGY

## 2024-04-19 PROCEDURE — 71260 CT THORAX DX C+: CPT | Performed by: RADIOLOGY

## 2024-04-19 PROCEDURE — 74177 CT ABD & PELVIS W/CONTRAST: CPT | Performed by: RADIOLOGY

## 2024-04-19 PROCEDURE — 2550000001 HC RX 255 CONTRASTS: Performed by: INTERNAL MEDICINE

## 2024-04-19 PROCEDURE — 74177 CT ABD & PELVIS W/CONTRAST: CPT

## 2024-04-19 RX ORDER — HEPARIN 100 UNIT/ML
100 SYRINGE INTRAVENOUS AS NEEDED
Status: DISCONTINUED | OUTPATIENT
Start: 2024-04-19 | End: 2024-04-20 | Stop reason: HOSPADM

## 2024-04-19 RX ADMIN — IOHEXOL 75 ML: 350 INJECTION, SOLUTION INTRAVENOUS at 11:53

## 2024-04-19 RX ADMIN — SODIUM CHLORIDE, PRESERVATIVE FREE 100 UNITS: 5 INJECTION INTRAVENOUS at 12:03

## 2024-04-25 ENCOUNTER — OFFICE VISIT (OUTPATIENT)
Dept: HEMATOLOGY/ONCOLOGY | Facility: HOSPITAL | Age: 29
End: 2024-04-25
Payer: COMMERCIAL

## 2024-04-25 VITALS
WEIGHT: 293 LBS | OXYGEN SATURATION: 100 % | SYSTOLIC BLOOD PRESSURE: 115 MMHG | RESPIRATION RATE: 18 BRPM | DIASTOLIC BLOOD PRESSURE: 96 MMHG | BODY MASS INDEX: 51.26 KG/M2 | HEART RATE: 92 BPM | TEMPERATURE: 98.2 F

## 2024-04-25 DIAGNOSIS — C20 PRIMARY MALIGNANT NEOPLASM OF RECTUM (MULTI): ICD-10-CM

## 2024-04-25 DIAGNOSIS — C20 RECTAL CANCER METASTASIZED TO LUNG (MULTI): ICD-10-CM

## 2024-04-25 DIAGNOSIS — C78.00 RECTAL CANCER METASTASIZED TO LUNG (MULTI): ICD-10-CM

## 2024-04-25 PROCEDURE — 99213 OFFICE O/P EST LOW 20 MIN: CPT | Performed by: INTERNAL MEDICINE

## 2024-04-25 ASSESSMENT — ENCOUNTER SYMPTOMS
OCCASIONAL FEELINGS OF UNSTEADINESS: 0
DEPRESSION: 0
LOSS OF SENSATION IN FEET: 0

## 2024-04-25 ASSESSMENT — PAIN SCALES - GENERAL: PAINLEVEL: 4

## 2024-04-25 NOTE — PROGRESS NOTES
Patient ID: Elizabeth Wolff is a 28 y.o. female from [place], [place].     Referring Physician: Dagoberto Platt [place]  [id number] [place] [address]    Primary Care Provider: AMADA Caban-CNP    Cancer History:          Colon and Rectum         AJCC Edition: 8th (AJCC), Diagnosis Date: Jun 2018, ALAN, T3 N1 M1a      Treatment Synopsis:    At 22-years of age she was  referred to [facility] for evaluation of rectal bleeding for  1 month. . She went to emergency room with  a rectal exam was done and she was told she has small external hemorrhoids. She did have significant perianal discomfort. She had a flex/sig, with Dr. Lozano, on 6/29/18, that showed: A fungating non-obstructing medium-sized mass was found in the proximal  rectum. The mass was non-circumferential. The mass measured seven cm in length. No bleeding was present.   MRI described a lesion in the mid rectum with angela involvement suspected.   CTs done revealed pulmonary nodules with bx done 8/7/18 confirming metastatic disease.  MS- Stable   NGS  RESULT: Positive for KRAS mutation. Negative for NRAS mutation. Negative for BRAF mutation. VARIANTS DETECTED: KRAS p.G12D (c.35G>A) TP53 p.V272E (c.815T>A) INTERPRETATION: KRAS p.G12D Gain-of-function   FDA/NCCN RECOMMENDATION: Resistance to Panitumumab and Cetuximab CLINICAL TRIALS: Ribociclib + Trametinib ([id number]); Trametinib + Navitoclax ([id number])      CEA was done and was normal      FAMILY HISTORY  MGM with breast cancer in 60s  MGGF MA - colon cancer -- youngest age 56  Sister- bx pending for poss NHL.  Paternal history negative (poor knowledge base)  Distant maternal side with multiple cancers: pancreatic      She is heterozygous for Rad50 gene  *PALB2 Variant of Uncertain Significance (VUS)*:     Treatment History:          2018-8-15: Chemotherapy: FolFox Cycle #1     2018-11-30: Chemotherapy: Completion of 8 Folfox     2019-2-4: Chemotherapy: FolFox restarted      2019-2-18: Chemotherapy: Treatment stopped per patient     2020-1-6: Chemotherapy: CapOx started with disease progression in lung     2020-1-13: Chemotherapy: CapOx stopped with poor tolerability     2020-2-6: Chemotherapy: Folfox started with oxaliplatin reaction noted     2020-4-2: Chemotherapy: Completion of 6 cycles Folfox       Past Medical History: Elizabeth has no past medical history on file.  Surgical History:  Elizabeth has a past surgical history that includes Other surgical history (04/27/2020); Other surgical history (08/17/2020); and Other surgical history (08/17/2020).  Social History:  Elizabeth reports that she has never smoked. She has never been exposed to tobacco smoke. She has never used smokeless tobacco. She reports that she does not currently use alcohol.  Family History:    Family History   Problem Relation Name Age of Onset    Colon cancer Mother's Brother      Skin cancer Maternal Grandfather      Breast cancer Other Maternal Relatives     Colon cancer Other Maternal Relatives     Pancreatic cancer Other Maternal Relatives      Family Oncology History:  Cancer-related family history includes Breast cancer in an other family member; Colon cancer in her mother's brother and another family member; Pancreatic cancer in an other family member; Skin cancer in her maternal grandfather.        SUBJECTIVE:    History of Present Illness:  Elizabeth Wolff is a 28 y.o. 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       OBJECTIVE:    VS / Pain:  BP (!) 115/96 (BP Location: Right arm, Patient Position: Sitting, BP Cuff Size: Large adult)   Pulse 92   Temp 36.8 °C (98.2 °F) (Skin)   Resp 18   Wt 144 kg (317 lb 9.6 oz)   SpO2 100%   BMI 51.26 kg/m²   BSA: 2.59 meters squared   Pain Scale: 0      Daily Weight  04/25/24 : 144 kg (317 lb 9.6 oz)  10/31/23 : 141 kg (310 lb)  10/12/23 : 142 kg (313 lb  7.9 oz)  06/26/23 : 143 kg (314 lb 2.5 oz)  03/21/23 : 142 kg (312 lb)        Physical Exam  Constitutional:       Appearance: Normal appearance.   HENT:      Nose: Nose normal.      Mouth/Throat:      Mouth: Mucous membranes are moist.   Eyes:      Extraocular Movements: Extraocular movements intact.      Conjunctiva/sclera: Conjunctivae normal.   Neck:      Comments: Skin over mediport is thin.  Hx of previous mediport erosion.  Cardiovascular:      Rate and Rhythm: Normal rate.   Musculoskeletal:         General: Normal range of motion.      Cervical back: Normal range of motion.   Skin:     General: Skin is warm.   Neurological:      General: No focal deficit present.       Performance Status:       Diagnostic Results     WBC   Date/Time Value Ref Range Status   04/16/2024 12:38 PM 8.5 4.4 - 11.3 x10*3/uL Final   01/04/2024 12:58 PM 9.0 4.4 - 11.3 x10*3/uL Final   11/01/2023 03:28 AM 11.3 4.4 - 11.3 x10*3/uL Final     Hemoglobin   Date Value Ref Range Status   04/16/2024 14.7 12.0 - 16.0 g/dL Final   01/04/2024 13.0 12.0 - 16.0 g/dL Final   11/01/2023 13.2 12.0 - 16.0 g/dL Final     MCV   Date/Time Value Ref Range Status   04/16/2024 12:38 PM 83 80 - 100 fL Final   01/04/2024 12:58 PM 83 80 - 100 fL Final   11/01/2023 03:28 AM 82 80 - 100 fL Final     Platelets   Date/Time Value Ref Range Status   04/16/2024 12:38  150 - 450 x10*3/uL Final   01/04/2024 12:58  150 - 450 x10*3/uL Final   11/01/2023 03:28  150 - 450 x10*3/uL Final     Neutrophils Absolute   Date/Time Value Ref Range Status   04/16/2024 12:38 PM 4.90 1.20 - 7.70 x10*3/uL Final     Comment:     Percent differential counts (%) should be interpreted in the context of the absolute cell counts (cells/uL).   01/04/2024 12:58 PM 5.88 1.20 - 7.70 x10*3/uL Final     Comment:     Percent differential counts (%) should be interpreted in the context of the absolute cell counts (cells/uL).   11/01/2023 03:28 AM 5.90 1.20 - 7.70 x10*3/uL Final  "    Comment:     Percent differential counts (%) should be interpreted in the context of the absolute cell counts (cells/uL).     Bilirubin, Total   Date/Time Value Ref Range Status   04/16/2024 12:38 PM 0.4 0.0 - 1.2 mg/dL Final   01/04/2024 12:58 PM 0.4 0.0 - 1.2 mg/dL Final   11/01/2023 03:28 AM 0.4 0.0 - 1.2 mg/dL Final     AST   Date/Time Value Ref Range Status   04/16/2024 12:38 PM 22 9 - 39 U/L Final     Comment:     MILD HEMOLYSIS DETECTED. The result may be falsely elevated due to hemolysis or other interferents. Clinical correlation is recommended. Repeat testing may be considered.   01/04/2024 12:58 PM 12 9 - 39 U/L Final   11/01/2023 03:28 AM 15 9 - 39 U/L Final     ALT   Date/Time Value Ref Range Status   04/16/2024 12:38 PM 14 7 - 45 U/L Final     Comment:     Patients treated with Sulfasalazine may generate falsely decreased results for ALT.   01/04/2024 12:58 PM 11 7 - 45 U/L Final     Comment:     Patients treated with Sulfasalazine may generate falsely decreased results for ALT.   11/01/2023 03:28 AM 13 7 - 45 U/L Final     Comment:     Patients treated with Sulfasalazine may generate falsely decreased results for ALT.     Creatinine   Date/Time Value Ref Range Status   04/16/2024 12:38 PM 0.69 0.50 - 1.05 mg/dL Final   01/04/2024 12:58 PM 0.65 0.50 - 1.05 mg/dL Final   11/01/2023 03:28 AM 0.70 0.50 - 1.05 mg/dL Final     Urea Nitrogen   Date/Time Value Ref Range Status   04/16/2024 12:38 PM 14 6 - 23 mg/dL Final   01/04/2024 12:58 PM 11 6 - 23 mg/dL Final   11/01/2023 03:28 AM 11 6 - 23 mg/dL Final     Albumin   Date/Time Value Ref Range Status   04/16/2024 12:38 PM 4.3 3.4 - 5.0 g/dL Final   01/04/2024 12:58 PM 4.0 3.4 - 5.0 g/dL Final   11/01/2023 03:28 AM 4.3 3.4 - 5.0 g/dL Final     No results found for: \"\"  Carcinoembryonic AG   Date/Time Value Ref Range Status   04/16/2024 12:38 PM <0.5 ug/L Final   01/04/2024 12:58 PM <0.5 ug/L Final       === 04/19/24 ===    CT CHEST ABDOMEN PELVIS W " "IV CONTRAST    - Impression -  Rectal cancer restaging scan.  1. Stable postoperative changes of left upper lobectomy without  evidence of locoregional recurrence or metastatic disease in the  chest, abdomen, or pelvis.  2. Additional unchanged chronic findings as detailed      I personally reviewed the images/study and I agree with the findings  as stated. This study was interpreted at [place], [place].    MACRO:  None    Signed by: Quique Jo 4/21/2024 12:18 AM  Dictation workstation:   [id number]          Assessment/Plan   Assessment and Plan:   Assessment:    1. Stage IV rectal cancer:        presenting with lung nodules and a rectal mass. She had complete response after 9 infusions of FOLFOX with no rectal mass identified by endoscopy. November 2019, she had growth of tumor in the left upper lobe and chemo was restarted. She was intolerant  of CapOX but completed 6 infusions of FOLFOX. Of note, she had an oxaliplatin reaction and requires a prolonged infusion time.     She did have a bronchoscopy and thoracotomy on the left side in July 2020 after 15 rounds of chemotherapy.      2020 endoscopy showing only \"scar\" at site of prior tumor. No bx was taken. She is therefor PAT and i would strongly advocate for close attention to the primary tumor site as she is most at risk for local recurrence.      Of note, imaging for her is likely to be complicated as she has had significant reactions to CT dye even with the addition of premeds. She described her last reaction as just feeling \"tingly\"   She also has a history of a reaction to MRI dye though not premedicated. MRI event-- respiratory with throat symptoms.     Recall she is SHERIDAN, Kras mutated (NRAS unmutated) and noted to have RAD50 gene.  Discussed clonoseq type testing vs Signatera to evaluate for any occult evidence of circulating tumor. Will follow with current testing and with any abnormalities this will be " done     MRI rectum in 2/2023 was negative.  Flex sig negative in 2/2023      Plan:   Rectal cancer:  PAT  -next Flex sig 9/2024  -CT every 6 mo for now until at least 2025, next is due in 10/2024.  Obtain over the next month and follow up.  - Negative Signatera 9/2023, repeat Q 3 months x 2 years.  - Continue port flushes at [place]  - OK for port removal when pt agrees.      2. Mediport care:  Ongoing care. Some flow issue noted.      3. Premature menopausal symptoms:   She has a Mirena in place and now without regular menstrual cycles. Some concern about her breast cancer risk with RAD50 gene. Will be attentive to yearly screening. Has follow up yearly with GYN in [place]     4. obesity:  This is an ongoing issue and somewhat limits exam. May also put her at increased risk from cancer perspective.        5. Genetics  Rad50 mutation noted-- may predict risk for Colon, Breast and ovarian.    Dagoberto Platt MD

## 2024-04-26 ENCOUNTER — APPOINTMENT (OUTPATIENT)
Dept: GYNECOLOGIC ONCOLOGY | Facility: CLINIC | Age: 29
End: 2024-04-26
Payer: COMMERCIAL

## 2024-05-02 ENCOUNTER — PREP FOR PROCEDURE (OUTPATIENT)
Dept: RADIOLOGY | Facility: HOSPITAL | Age: 29
End: 2024-05-02
Payer: COMMERCIAL

## 2024-05-02 DIAGNOSIS — C78.02 MALIGNANT NEOPLASM METASTATIC TO LEFT LUNG (MULTI): ICD-10-CM

## 2024-05-02 RX ORDER — FAMOTIDINE 20 MG/1
20 TABLET, FILM COATED ORAL ONCE
Qty: 1 TABLET | Refills: 3 | Status: SHIPPED | OUTPATIENT
Start: 2024-05-02 | End: 2024-05-02

## 2024-05-02 RX ORDER — PREDNISONE 50 MG/1
50 TABLET ORAL DAILY
Qty: 3 TABLET | Refills: 3 | Status: SHIPPED | OUTPATIENT
Start: 2024-05-02 | End: 2024-05-03

## 2024-05-10 ENCOUNTER — APPOINTMENT (OUTPATIENT)
Dept: GYNECOLOGIC ONCOLOGY | Facility: CLINIC | Age: 29
End: 2024-05-10
Payer: COMMERCIAL

## 2024-05-15 RX ORDER — SODIUM CHLORIDE 9 MG/ML
100 INJECTION, SOLUTION INTRAVENOUS CONTINUOUS
Status: CANCELLED | OUTPATIENT
Start: 2024-05-15

## 2024-05-16 ENCOUNTER — HOSPITAL ENCOUNTER (OUTPATIENT)
Dept: CARDIOLOGY | Facility: HOSPITAL | Age: 29
Discharge: HOME | End: 2024-05-16
Payer: COMMERCIAL

## 2024-05-16 ENCOUNTER — HOSPITAL ENCOUNTER (OUTPATIENT)
Dept: RADIOLOGY | Facility: HOSPITAL | Age: 29
Discharge: HOME | End: 2024-05-16
Payer: COMMERCIAL

## 2024-05-16 VITALS
TEMPERATURE: 97.5 F | HEART RATE: 75 BPM | RESPIRATION RATE: 14 BRPM | SYSTOLIC BLOOD PRESSURE: 135 MMHG | DIASTOLIC BLOOD PRESSURE: 90 MMHG

## 2024-05-16 DIAGNOSIS — C20 RECTAL CANCER METASTASIZED TO LUNG (MULTI): ICD-10-CM

## 2024-05-16 DIAGNOSIS — C78.00 RECTAL CANCER METASTASIZED TO LUNG (MULTI): ICD-10-CM

## 2024-05-16 PROCEDURE — 7100000009 HC PHASE TWO TIME - INITIAL BASE CHARGE

## 2024-05-16 PROCEDURE — 71045 X-RAY EXAM CHEST 1 VIEW: CPT

## 2024-05-16 PROCEDURE — 71045 X-RAY EXAM CHEST 1 VIEW: CPT | Performed by: RADIOLOGY

## 2024-05-16 PROCEDURE — 7100000010 HC PHASE TWO TIME - EACH INCREMENTAL 1 MINUTE

## 2024-05-16 RX ORDER — HEPARIN 100 UNIT/ML
100 SYRINGE INTRAVENOUS AS NEEDED
Status: DISCONTINUED | OUTPATIENT
Start: 2024-05-16 | End: 2024-05-17 | Stop reason: HOSPADM

## 2024-05-16 ASSESSMENT — ENCOUNTER SYMPTOMS
ALLERGIC/IMMUNOLOGIC NEGATIVE: 1
GASTROINTESTINAL NEGATIVE: 1
HEMATOLOGIC/LYMPHATIC NEGATIVE: 1
PSYCHIATRIC NEGATIVE: 1
CONSTITUTIONAL NEGATIVE: 1
EYES NEGATIVE: 1
RESPIRATORY NEGATIVE: 1
ENDOCRINE NEGATIVE: 1
MUSCULOSKELETAL NEGATIVE: 1
NEUROLOGICAL NEGATIVE: 1
CARDIOVASCULAR NEGATIVE: 1

## 2024-05-16 ASSESSMENT — PAIN - FUNCTIONAL ASSESSMENT: PAIN_FUNCTIONAL_ASSESSMENT: 0-10

## 2024-05-16 NOTE — DISCHARGE INSTRUCTIONS
What is a Central Venous Access Port? Patient and Family Education    What is a Central Venous Access Port?  A central venous access port is a small device made up of 2 parts:  ? The port, which is a hollow metal or plastic disk with a rubber center and  ? The tube (also called a catheter) that connects to the port. The catheter is  threaded through a vein that leads to your heart.  A doctor places the port under the skin in the chest near the collarbone, or in the arm. The port  feels like a small bump. Once your port site heals it should not hurt. A port provides easy  access to a vein. A port is useful if you need frequent intravenous (IV) treatments, medicines,  blood tests or blood products. A port can stay in for months or years if it is cared for correctly  and working as it should. A port may also be called a Mediport, Power Port or Port-a-cath.    Caring for Your Port:  -A trained nurse must use a special non-coring needle, called a Jacobson needle, each time they  access your port. The needle is placed through your skin and into the rubber center of the port.  -You will likely feel slight pricking when your nurse inserts the needle. The needle stays in  place for your treatments and can be removed afterwards.  -Your port needs to be flushed every 4 to 6 weeks to help prevent blood clots  from forming in the catheter. If blood clots form and cause a blockage, your  port may need to be removed. Your nurse will flush your port with saline. If  needed, they may also flush it with a blood thinning medicine called heparin.  -Port flushes are done right before the needle is taken out. Some patients are  taught how to do these flushes at home. If you need to flush your port at home,  your nurse will show you how. If your port is not being used at least once  every 4 to 6 weeks, talk with your doctor or nurse about scheduling port  flushes.    When to Call Your Doctor:  ? Redness, swelling or drainage near the port or  over the port site.  ? Drainage from the port site.  ? Shake or chills.  ? Temperature of 100.4°F (38°C) or higher.  ? Pain, warm or soreness at the port site.  ? Swelling of your arm, check at the port site.  ? Also call if the port has been moved  ? If you have any questions about your port.     How to Reach your Doctor:    Call [phone] with problems or questions    This info is a general resource. It is not meant to replace your health care provider’s advice.  Ask your doctor or health care team any questions. Always follow their instructions.

## 2024-05-16 NOTE — NURSING NOTE
RN accessed port via standard protocol.  Blood return noted.  Free flowing IV Fluids.  No pain or discomfort.  Patient visit today was regarding previously endorsed pain and issues with gaining accessing mediport.  For future access ensure patient is in supine position and a needle length of greater than 1inch is used.  This RN suspects previous issue was due due to incomplete access from inadequate needle length or patient sitting up during access.  Patient educated to speak with accessing RN to ensure the port is successfully accessed on future attempts.

## 2024-05-16 NOTE — H&P
History Of Present Illness  Elizabeth Wolff is a 28 y.o. female presenting with hx of rectal CA, here for mediport evaluation after having discomfort post access. PMH includes anaphylaxis reaction to contrast iodine, GERD, DVT (no longer on AC), stage IV rectal cancer with metastases to lung status post lobectomy-currently in remission      Past Medical History  She has no past medical history on file.    Surgical History  She has a past surgical history that includes Other surgical history (04/27/2020); Other surgical history (08/17/2020); and Other surgical history (08/17/2020).     Social History  She reports that she has never smoked. She has never been exposed to tobacco smoke. She has never used smokeless tobacco. She reports that she does not currently use alcohol. No history on file for drug use.    Family History  Family History   Problem Relation Name Age of Onset    Colon cancer Mother's Brother      Skin cancer Maternal Grandfather      Breast cancer Other Maternal Relatives     Colon cancer Other Maternal Relatives     Pancreatic cancer Other Maternal Relatives         Allergies  Iodine and Other    Home Medications  Current Outpatient Medications on File Prior to Encounter   Medication Sig Dispense Refill    diphenhydrAMINE (BenadryL) 25 mg capsule Take by mouth once daily. Take per directed      levonorgestrel (Mirena) 21 mcg/24 hours (8 yrs) 52 mg IUD by intrauterine route. Take as directed      ascorbic acid (Vitamin C) 250 mg tablet Take 1 tablet (250 mg) by mouth once daily.      cholecalciferol (Vitamin D-3) 1,250 mcg (50,000 unit) capsule Take by mouth. Take per directed      dicyclomine (Bentyl) 10 mg capsule Take 1 capsule (10 mg) by mouth every 6 hours if needed.      diphenhydrAMINE (BENADryl) 50 mg tablet Take 1 tablet (50 mg) by mouth 1 time for 1 dose. Take 1 tablet 1 hour prior to your imaging test with contrast. 1 tablet 3    famotidine (Pepcid) 20 mg tablet Take 1 tablet (20 mg) by  mouth 1 time for 1 dose. 1 tablet 3    hydrocortisone (Ala-Héctor) 1 % cream Apply topically twice a day. Take per directed      omeprazole (PriLOSEC) 40 mg DR capsule Take 1 capsule (40 mg) by mouth once daily in the morning. Take before meals.      omeprazole OTC (PriLOSEC OTC) 20 mg EC tablet Take 1 tablet (20 mg) by mouth once daily in the morning. Take before meals. Do not crush, chew, or split.       No current facility-administered medications on file prior to encounter.          Inpatient Medications:            Review of Systems   Constitutional: Negative.    HENT: Negative.     Eyes: Negative.    Respiratory: Negative.     Cardiovascular: Negative.    Gastrointestinal: Negative.    Endocrine: Negative.    Genitourinary: Negative.    Musculoskeletal: Negative.    Skin: Negative.    Allergic/Immunologic: Negative.    Neurological: Negative.    Hematological: Negative.    Psychiatric/Behavioral: Negative.            Physical Exam  Constitutional:       General: She is awake. She is not in acute distress.     Appearance: She is not ill-appearing.   Cardiovascular:      Rate and Rhythm: Normal rate and regular rhythm.      Pulses: Normal pulses.           Radial pulses are 2+ on the right side and 2+ on the left side.        Dorsalis pedis pulses are 2+ on the right side and 2+ on the left side.      Heart sounds: Normal heart sounds. No murmur heard.  Pulmonary:      Effort: Pulmonary effort is normal.      Breath sounds: Normal breath sounds and air entry.   Abdominal:      General: Bowel sounds are normal.      Palpations: Abdomen is soft.      Tenderness: There is no abdominal tenderness.   Musculoskeletal:      Right lower leg: No edema.      Left lower leg: No edema.   Skin:     General: Skin is warm and dry.   Neurological:      General: No focal deficit present.      Mental Status: She is alert and oriented to person, place, and time.      GCS: GCS eye subscore is 4. GCS verbal subscore is 5. GCS motor  "subscore is 6.   Psychiatric:         Mood and Affect: Mood normal.         Behavior: Behavior is cooperative.        Sedation Plan    ASA 3     Mallampati class: III.         Last Recorded Vitals  Blood pressure 135/90, pulse 75, temperature 36.4 °C (97.5 °F), temperature source Temporal, resp. rate 14.         Vitals from the Past 24 Hours  Heart Rate:  [75]   Temp:  [36.4 °C (97.5 °F)]   Resp:  [14]   BP: (135)/(90)          Relevant Results    Labs    CBC:   Recent Labs     04/16/24  1238 01/04/24  1258 11/01/23  0328 08/29/23  0901 04/24/23  1329 04/06/23  0914   WBC 8.5 9.0 11.3 CANCELED 10.1 9.2   HGB 14.7 13.0 13.2 CANCELED 12.9 12.4   HCT 46.3* 40.7 40.0 CANCELED 39.0 38.3    255 278 CANCELED 262 247   MCV 83 83 82 CANCELED 82 81     BMP/CMP:   Recent Labs     04/16/24  1238 01/04/24  1258 11/01/23  0328 08/29/23  1048 04/24/23  1329 04/06/23  0914    140 138 CANCELED 141 141   K 4.5 3.9 3.9 CANCELED 4.0 3.6   * 107 106 CANCELED 108* 107   BUN 14 11 11 CANCELED 15 13   CREATININE 0.69 0.65 0.70 CANCELED 0.52 0.63   CO2 25 27 23 CANCELED 25 24   CALCIUM 9.1 9.1 9.0 CANCELED 9.0 9.0   PROT 7.5 6.8 7.4 CANCELED 6.5 6.7   BILITOT 0.4 0.4 0.4 CANCELED 0.3 0.4   ALKPHOS 82 79 83 CANCELED 84 74   ALT 14 11 13 CANCELED 10 10   AST 22 12 15 CANCELED 11 10   GLUCOSE 83 110* 93 CANCELED 92 97      Lipid Panel: No results for input(s): \"CHOL\", \"HDL\", \"CHHDL\", \"LDL\", \"VLDL\", \"TRIG\", \"NHDL\" in the last 44846 hours.  Cardiac       No lab exists for component: \"CK\", \"CKMBP\"   Hemoglobin A1C: No results for input(s): \"HGBA1C\" in the last 95670 hours.  TSH/ Free T4: No results for input(s): \"TSH\", \"FREET4\" in the last 68887 hours.  Iron:   Recent Labs     10/04/21  1050 03/26/21  1344 02/02/21  1602   FERRITIN 102 112 41   TIBC 319 342  --    IRONSAT 22* 13*  --      Coag:     ABO: No results found for: \"ABO\"    Past Cardiology Tests (Last 3 Years):    EKG:  Recent Labs     07/31/20 2005 09/11/19  1756 " "  ATRRATE 81 83   VENTRATE 81 83   PRINT 142 140   QRSDUR 86 90   QTCFRED 402 419   QTCCALCB 422 441   No results found for this or any previous visit (from the past 4464 hour(s)).  Echo:  Echocardiogram:   Echocardiogram     Study Date:       7/17/2020              PHYSICIAN INTERPRETATION:  Left Ventricle: The left ventricular systolic function is normal, with an estimated ejection fraction of 60%. There are no regional wall motion abnormalities. The left ventricular cavity size is normal. Spectral Doppler shows a normal pattern of left ventricular diastolic filling.  Left Atrium: The left atrium is normal in size.  Right Ventricle: The right ventricle is normal in size. There is normal right ventricular global systolic function.  Right Atrium: The right atrium is normal in size.  Aortic Valve: The aortic valve appears structurally normal. There is no evidence of aortic valve regurgitation. The peak instantaneous gradient of the aortic valve is 3.7 mmHg. The mean gradient of the aortic valve is 2.5 mmHg.  Mitral Valve: The mitral valve is normal in structure. There is trace mitral valve regurgitation.  Tricuspid Valve: The tricuspid valve is structurally normal. No evidence of tricuspid regurgitation. The right ventricular systolic pressure is unable to be estimated.  Pulmonic Valve: The pulmonic valve is structurally normal. There is trace pulmonic valve regurgitation.  Pericardium: There is no pericardial effusion noted.  Aorta: The aortic root is normal.  Systemic Veins: The inferior vena cava appears to be of normal size.      CONCLUSIONS:  1. The left ventricular systolic function is normal with a 60% estimated ejection fraction.  2. Poorly visualized anatomical structures due to suboptimal image quality.    Ejection Fractions:  No results found for: \"EF\"  Cath:  Coronary Angiography: No results found for this or any previous visit from the past 1800 days.    Right Heart Cath: No results found for this or any " previous visit from the past 1800 days.    Stress Test:  Nuclear:No results found for this or any previous visit from the past 1800 days.    Metabolic Stress: No results found for this or any previous visit from the past 1800 days.    Cardiac Imaging:  Cardiac Scoring: No results found for this or any previous visit from the past 1800 days.    Cardiac MRI: No results found for this or any previous visit from the past 1800 days.         Assessment/Plan  Assessment/Plan   Active Problems:  There are no active Hospital Problems.        hx of rectal CA, mediport evaluation after having discomfort post access  -CXR to evaluate placement  -nursing staff with check port via access with flush to determine if any pain or infiltration if CXR shows proper placement    Hx of anaphylaxis reaction to contrast iodine  -premedicated with prednisone, benadryl, pepcid  -due to severity of reaction and reason for mediport evaluation, Dr. Blanco decided against dye study at this time       I spent 30 minutes in the professional and overall care of this patient.      Charles Joyce, APRN-CNP, DNP

## 2024-10-17 ENCOUNTER — OFFICE VISIT (OUTPATIENT)
Dept: URGENT CARE | Age: 29
End: 2024-10-17
Payer: COMMERCIAL

## 2024-10-17 VITALS
TEMPERATURE: 98.6 F | RESPIRATION RATE: 16 BRPM | HEART RATE: 91 BPM | SYSTOLIC BLOOD PRESSURE: 136 MMHG | OXYGEN SATURATION: 99 % | DIASTOLIC BLOOD PRESSURE: 87 MMHG

## 2024-10-17 DIAGNOSIS — J01.40 ACUTE PANSINUSITIS, RECURRENCE NOT SPECIFIED: Primary | ICD-10-CM

## 2024-10-17 RX ORDER — FLUTICASONE PROPIONATE 50 MCG
1 SPRAY, SUSPENSION (ML) NASAL 2 TIMES DAILY
Qty: 16 G | Refills: 0 | Status: SHIPPED | OUTPATIENT
Start: 2024-10-17 | End: 2024-10-17 | Stop reason: ENTERED-IN-ERROR

## 2024-10-17 RX ORDER — AMOXICILLIN AND CLAVULANATE POTASSIUM 875; 125 MG/1; MG/1
1 TABLET, FILM COATED ORAL 2 TIMES DAILY
Qty: 20 TABLET | Refills: 0 | Status: SHIPPED | OUTPATIENT
Start: 2024-10-17 | End: 2024-10-17 | Stop reason: SDUPTHER

## 2024-10-17 RX ORDER — FLUTICASONE PROPIONATE 50 MCG
1 SPRAY, SUSPENSION (ML) NASAL 2 TIMES DAILY PRN
Qty: 16 G | Refills: 0 | Status: SHIPPED | OUTPATIENT
Start: 2024-10-17 | End: 2025-10-17

## 2024-10-17 RX ORDER — FLUTICASONE PROPIONATE 50 MCG
1 SPRAY, SUSPENSION (ML) NASAL 2 TIMES DAILY
Qty: 16 G | Refills: 0 | Status: SHIPPED | OUTPATIENT
Start: 2024-10-17 | End: 2024-10-17 | Stop reason: SDUPTHER

## 2024-10-17 RX ORDER — AMOXICILLIN AND CLAVULANATE POTASSIUM 875; 125 MG/1; MG/1
1 TABLET, FILM COATED ORAL 2 TIMES DAILY
Qty: 20 TABLET | Refills: 0 | Status: SHIPPED | OUTPATIENT
Start: 2024-10-17 | End: 2024-10-17 | Stop reason: ENTERED-IN-ERROR

## 2024-10-17 RX ORDER — AMOXICILLIN AND CLAVULANATE POTASSIUM 875; 125 MG/1; MG/1
875 TABLET, FILM COATED ORAL 2 TIMES DAILY
Qty: 20 TABLET | Refills: 0 | Status: SHIPPED | OUTPATIENT
Start: 2024-10-17

## 2024-10-17 ASSESSMENT — ENCOUNTER SYMPTOMS
TROUBLE SWALLOWING: 0
VOMITING: 0
SHORTNESS OF BREATH: 0
HOARSE VOICE: 0
STRIDOR: 0
ABDOMINAL PAIN: 0
COUGH: 0
HEADACHES: 1
DIARRHEA: 0
SORE THROAT: 1
NECK PAIN: 0
SWOLLEN GLANDS: 0

## 2024-10-17 NOTE — PROGRESS NOTES
Subjective   Patient ID: Elizabeth Wolff is a 28 y.o. female. They present today with a chief complaint of Sore Throat, Nasal Congestion, and Earache (X 3 weeks).    History of Present Illness    History provided by:  Patient   used: No    Sore Throat   This is a new problem. Episode onset: 3 weeks ago. The problem has been gradually worsening. The pain is at a severity of 5/10. The pain is moderate. Associated symptoms include congestion (purulent discharge), ear pain and headaches. Pertinent negatives include no abdominal pain, coughing, diarrhea, drooling, ear discharge, hoarse voice, plugged ear sensation, neck pain, shortness of breath, stridor, swollen glands, trouble swallowing or vomiting. Treatments tried: Dayquil. The treatment provided no relief.   Earache   Associated symptoms include headaches and a sore throat. Pertinent negatives include no abdominal pain, coughing, diarrhea, ear discharge, neck pain or vomiting.       Past Medical History  Allergies as of 10/17/2024 - Reviewed 10/17/2024   Allergen Reaction Noted    Iodine Swelling 09/07/2023    Other Swelling 09/07/2023       (Not in a hospital admission)       No past medical history on file.    Past Surgical History:   Procedure Laterality Date    OTHER SURGICAL HISTORY  04/27/2020    Sigmoidoscopy    OTHER SURGICAL HISTORY  08/17/2020    Lung lobectomy    OTHER SURGICAL HISTORY  08/17/2020    Thoracotomy        reports that she has never smoked. She has never been exposed to tobacco smoke. She has never used smokeless tobacco. She reports that she does not currently use alcohol.    Review of Systems  Review of Systems   HENT:  Positive for congestion (purulent discharge), ear pain and sore throat. Negative for drooling, ear discharge, hoarse voice and trouble swallowing.    Respiratory:  Negative for cough, shortness of breath and stridor.    Gastrointestinal:  Negative for abdominal pain, diarrhea and vomiting.    Musculoskeletal:  Negative for neck pain.   Neurological:  Positive for headaches.                                  Objective    Vitals:    10/17/24 1124   BP: 136/87   Pulse: 91   Resp: 16   Temp: 37 °C (98.6 °F)   SpO2: 99%     No LMP recorded. Patient has had an implant.    Physical Exam  Vitals and nursing note reviewed.   Constitutional:       Appearance: Normal appearance.   HENT:      Head: Normocephalic and atraumatic.      Right Ear: Hearing, tympanic membrane, ear canal and external ear normal.      Left Ear: Hearing, tympanic membrane, ear canal and external ear normal.      Nose: Mucosal edema, congestion and rhinorrhea present. No nasal deformity, septal deviation, signs of injury, laceration or nasal tenderness. Rhinorrhea is purulent.      Right Sinus: Maxillary sinus tenderness and frontal sinus tenderness present.      Left Sinus: No maxillary sinus tenderness or frontal sinus tenderness.      Mouth/Throat:      Lips: Pink.      Mouth: Mucous membranes are moist.      Pharynx: Uvula midline.      Tonsils: No tonsillar exudate or tonsillar abscesses.   Cardiovascular:      Rate and Rhythm: Normal rate and regular rhythm.      Heart sounds: Normal heart sounds.   Pulmonary:      Effort: Pulmonary effort is normal.      Breath sounds: Normal breath sounds and air entry.   Musculoskeletal:      Cervical back: Normal range of motion and neck supple.   Lymphadenopathy:      Cervical: No cervical adenopathy.   Neurological:      Mental Status: She is alert.   Psychiatric:         Mood and Affect: Mood normal.         Behavior: Behavior normal.         Procedures    Point of Care Test & Imaging Results from this visit  No results found for this visit on 10/17/24.   No results found.    Diagnostic study results (if any) were reviewed by YONATAN Lawrence.    Assessment/Plan   Allergies, medications, history, and pertinent labs/EKGs/Imaging reviewed by YONATAN Lawrence.     Medical Decision  Making  Take the antibiotic with food.  Eat yogurt or take probiotic once a day.  Symptoms should improve in 2 to 3 days.   Wash your hands often, especially after coughing or touching your nose or mouth.  Use disposable tissues instead of handkerchiefs.  Increase fluid intake and rest as needed.  Take Tylenol and/or ibuprofen as needed for aches and pain and/or fever.  Return or follow-up with primary care provider if symptoms did not improve.  Call 911 or go to the nearest emergency room if symptoms became severe such as fever of 102.5 degrees Fahrenheit or 39.2 degrees Celsius, severe pain, shortness of breath, chest tightness.   Patient verbalized understanding of the instructions and left in stable condition.    Orders and Diagnoses  Diagnoses and all orders for this visit:  Acute pansinusitis, recurrence not specified  -     amoxicillin-pot clavulanate (Augmentin) 875-125 mg tablet; Take 1 tablet (875 mg) by mouth 2 times a day.  -     fluticasone (Flonase) 50 mcg/actuation nasal spray; Administer 1 spray into each nostril 2 times a day as needed for rhinitis. Shake gently. Before first use, prime pump. After use, clean tip and replace cap.      Medical Admin Record      Patient disposition: Home    Electronically signed by YONATAN Lawrence  11:37 AM       Myalgia Monitoring: I explained this is common when taking isotretinoin. If this worsens they will contact us. Headache Monitoring: I recommended monitoring the headaches for now. There is no evidence of increased intracranial pressure. They were instructed to call if the headaches are worsening. Myalgia Treatment: I explained this is common when taking isotretinoin. If this worsens they will contact us. They may try OTC ibuprofen. Ipledge Number (Optional): [id number] Cheilitis Normal Treatment: I recommended application of Vaseline or Aquaphor numerous times a day (as often as every hour) and before going to bed. Pounds Preamble Statement (Weight Entered In Details Tab): Reported Weight in pounds: Any Hypercholesterolemia?: No Weight Units: pounds Cheilitis Aggressive Treatment: I recommended application of Vaseline or Aquaphor numerous times a day (as often as every hour) and before going to bed. I also prescribed a topical steroid for twice daily use. Kilograms Preamble Statement (Weight Entered In Details Tab): Reported Weight in kilograms: Female Completion Statement: After discussing her treatment course we decided to discontinue isotretinoin therapy at this time. I explained that she would need to continue her birth control methods for at least one month after the last dosage. She should also get a pregnancy test one month after the last dose. She shouldn't donate blood for one month after the last dose. She should call with any new symptoms of depression. Patient Weight (Optional But Required For Cumulative Dose-Numbers And Decimals Only): 140 Retinoid Dermatitis Normal Treatment: I recommended more frequent application of Cetaphil or CeraVe to the areas of dermatitis. Male Completion Statement: After discussing his treatment course we decided to discontinue isotretinoin therapy at this time. He shouldn't donate blood for one month after the last dose. He should call with any new symptoms of depression. Months Of Therapy Completed: 1 Hypercholesterolemia Monitoring: I explained this is common when taking isotretinoin. We will monitor closely. Retinoid Dermatitis Aggressive Treatment: I recommended more frequent application of Cetaphil or CeraVe to the areas of dermatitis. I also prescribed a topical steroid for twice daily use until the dermatitis resolves. Nosebleeds Normal Treatment: I explained this is common when taking isotretinoin. I recommended saline mist in each nostril multiple times a day. If this worsens they will contact us. Most Recent Cbc (Optional): pending Xerosis Normal Treatment: I recommended application of Cetaphil or CeraVe numerous times a day going to bed to all dry areas. Display Individual Monthly Dosage In The Note (If Yes Will Display All Dosages Which Are Not N/A): yes Xerosis Aggressive Treatment: I recommended application of Cetaphil or CeraVe numerous times a day going to bed to all dry areas. I also prescribed a topical steroid for twice daily use. Dosing Month 1 (Required For Cumulative Dosing): 40mg Daily Most Recent Beta Hcg (Optional): urine pregnancy negative Is Xerosis Present?: Yes - Normal Treatment Detail Level: Zone Xerosis Normal Treatment: I recommended application of Cetaphil or CeraVe numerous times a day and before going to bed to all dry areas. What Is The Patient's Gender: Female Xerosis Aggressive Treatment: I recommended application of Cetaphil or CeraVe numerous times a day and before going to bed to all dry areas. I also prescribed a topical steroid for twice daily use. Counseling Text: I reviewed the side effect in detail. Patient should get monthly blood tests, not donate blood, not drive at night if vision affected, and not share medication. Female Pregnancy Counseling Text: Female patients should also be on two forms of birth control while taking this medication and for one month after their last dose.

## 2024-10-21 ENCOUNTER — OFFICE VISIT (OUTPATIENT)
Dept: URGENT CARE | Age: 29
End: 2024-10-21
Payer: COMMERCIAL

## 2024-10-21 VITALS
RESPIRATION RATE: 18 BRPM | SYSTOLIC BLOOD PRESSURE: 126 MMHG | DIASTOLIC BLOOD PRESSURE: 73 MMHG | OXYGEN SATURATION: 98 % | HEART RATE: 89 BPM | TEMPERATURE: 98.7 F

## 2024-10-21 DIAGNOSIS — J01.91 ACUTE RECURRENT SINUSITIS, UNSPECIFIED LOCATION: Primary | ICD-10-CM

## 2024-10-21 DIAGNOSIS — Z20.822 SUSPECTED COVID-19 VIRUS INFECTION: ICD-10-CM

## 2024-10-21 LAB — POC SARS-COV-2 AG BINAX: NORMAL

## 2024-10-21 PROCEDURE — 1036F TOBACCO NON-USER: CPT

## 2024-10-21 PROCEDURE — 87811 SARS-COV-2 COVID19 W/OPTIC: CPT

## 2024-10-21 PROCEDURE — 99499 UNLISTED E&M SERVICE: CPT

## 2024-10-21 RX ORDER — DOXYCYCLINE 100 MG/1
100 CAPSULE ORAL 2 TIMES DAILY
Qty: 14 CAPSULE | Refills: 0 | Status: SHIPPED | OUTPATIENT
Start: 2024-10-21 | End: 2024-10-28

## 2024-10-21 RX ORDER — PREDNISONE 20 MG/1
40 TABLET ORAL DAILY
Qty: 10 TABLET | Refills: 0 | Status: SHIPPED | OUTPATIENT
Start: 2024-10-21 | End: 2024-10-26

## 2024-10-21 ASSESSMENT — ENCOUNTER SYMPTOMS
COUGH: 1
FEVER: 1
SORE THROAT: 1
CARDIOVASCULAR NEGATIVE: 1
CHILLS: 1

## 2024-10-21 NOTE — LETTER
October 21, 2024     Patient: Elizabeth Wolff   YOB: 1995   Date of Visit: 10/21/2024       To Whom It May Concern:    Elizabeth Wolff was seen in my clinic on 10/21/2024 at 11:35 am. Please excuse Elizabeth for her absence from work on this day to make the appointment.    If you have any questions or concerns, please don't hesitate to call.         Sincerely,         Wesly Hurt PA-C        CC: No Recipients

## 2024-10-21 NOTE — PROGRESS NOTES
Subjective   Patient ID: Elizabeth Wolff is a 28 y.o. female. They present today with a chief complaint of Cough (Dry cough, fever).    History of Present Illness  28-year-old female presents to clinic today with complaints of sinus congestion, cough.  Patient was recently seen here and placed on Augmentin.  Patient states that she is on day 4 and has noted no improvement.  She states if anything it has worsened.  She has now developed a dry cough and even felt feverish yesterday.  She denies chest pain but states she has developed some wheezing.  Patient has been taking over-the-counter cough and cold medication as well.          Past Medical History  Allergies as of 10/21/2024 - Reviewed 10/21/2024   Allergen Reaction Noted    Iodine Swelling 09/07/2023    Other Swelling 09/07/2023       (Not in a hospital admission)       No past medical history on file.    Past Surgical History:   Procedure Laterality Date    OTHER SURGICAL HISTORY  04/27/2020    Sigmoidoscopy    OTHER SURGICAL HISTORY  08/17/2020    Lung lobectomy    OTHER SURGICAL HISTORY  08/17/2020    Thoracotomy        reports that she has never smoked. She has never been exposed to tobacco smoke. She has never used smokeless tobacco. She reports that she does not currently use alcohol.    Review of Systems  Review of Systems   Constitutional:  Positive for chills and fever.   HENT:  Positive for congestion and sore throat.    Respiratory:  Positive for cough.    Cardiovascular: Negative.                                   Objective    Vitals:    10/21/24 1143   BP: 126/73   Pulse: 89   Resp: 18   Temp: 37.1 °C (98.7 °F)   TempSrc: Temporal   SpO2: 98%     No LMP recorded. Patient has had an implant.    Physical Exam  Constitutional:       General: She is not in acute distress.     Appearance: Normal appearance.   HENT:      Right Ear: Tympanic membrane and ear canal normal.      Left Ear: Tympanic membrane and ear canal normal.      Mouth/Throat:       Mouth: Mucous membranes are moist.      Pharynx: No oropharyngeal exudate or posterior oropharyngeal erythema.   Cardiovascular:      Rate and Rhythm: Normal rate and regular rhythm.      Heart sounds: Normal heart sounds.   Pulmonary:      Effort: Pulmonary effort is normal.      Breath sounds: Normal breath sounds.   Neurological:      Mental Status: She is alert.         Procedures    Point of Care Test & Imaging Results from this visit  Results for orders placed or performed in visit on 10/21/24   POCT Covid-19 Rapid Antigen   Result Value Ref Range    POC MARÍA-COV-2 AG  Presumptive negative test for SARS-CoV-2 (no antigen detected)     Presumptive negative test for SARS-CoV-2 (no antigen detected)      No results found.    Diagnostic study results (if any) were reviewed by Wesly Hurt PA-C.    Assessment/Plan   Allergies, medications, history, and pertinent labs/EKGs/Imaging reviewed by Wesly Hurt PA-C.     Medical Decision Making  Rapid COVID testing negative.  This was completed due to the recent worsening of symptoms.  Cardiopulmonary exam within normal limits.  I did switch patient's antibiotic to doxycycline due to no improvement on Augmentin.  I also started her on a short course of prednisone.  Advised on proper over-the-counter medications to supplement with.  If no improvement to follow-up with primary care.    Orders and Diagnoses  Diagnoses and all orders for this visit:  Acute recurrent sinusitis, unspecified location  -     doxycycline (Vibramycin) 100 mg capsule; Take 1 capsule (100 mg) by mouth 2 times a day for 7 days. Take with at least 8 ounces (large glass) of water, do not lie down for 30 minutes after  -     predniSONE (Deltasone) 20 mg tablet; Take 2 tablets (40 mg) by mouth once daily for 5 days.  Suspected COVID-19 virus infection  -     POCT Covid-19 Rapid Antigen      Medical Admin Record      Patient disposition: Home    Electronically signed by Wesly Hurt PA-C  12:05  PM

## 2024-10-21 NOTE — PATIENT INSTRUCTIONS
Consider taking Mucinex for congestion. Make sure to drink plenty of fluids while taking this medication as it increases its effectiveness.     Consider Zyrtec or Claritin    Consider Flonase Nasal Spray    Consider taking Sudafed to help decrease any congestion. If no history of high blood pressure.  Consider Corcedin BP for high blood pressure.    Use throat lozenges, buckwheat honey, gargling salt water to help relieve sore throat symptoms.     Recommend inhaling steam from a hot shower or hot bath as well as using saline sinus rinse for congestion. Recommend Honorio Med sinus rinse. Make sure to use bottled or distilled water.

## 2024-10-24 ENCOUNTER — APPOINTMENT (OUTPATIENT)
Dept: HEMATOLOGY/ONCOLOGY | Facility: HOSPITAL | Age: 29
End: 2024-10-24
Payer: COMMERCIAL

## 2024-10-24 ENCOUNTER — APPOINTMENT (OUTPATIENT)
Dept: RADIOLOGY | Facility: HOSPITAL | Age: 29
End: 2024-10-24
Payer: COMMERCIAL

## 2024-11-04 ENCOUNTER — NURSE TRIAGE (OUTPATIENT)
Dept: ADMISSION | Facility: HOSPITAL | Age: 29
End: 2024-11-04
Payer: COMMERCIAL

## 2024-11-04 NOTE — TELEPHONE ENCOUNTER
Pt called to report that she was in the ED yesterday and has been diagnosed with bronchitis.   She initially had sinus infection last month which improved after 2 rounds of antibiotics.  She felt better briefly and then went in yesterday with SOB.  She's had no fevers and notes that cough is improving.  She was prescribed an inhaler and short course of prednisone which she started yesterday.    She would like to postpone her 11/6 scan and follow up as she still gets winded easily.  She had a CT chest angio done in the ED, though there was an issue with the timing of contrast administration.  She asked those images will be acceptable for Dr. Platt's purposes or if CT chest (as part of CT c/a/p) needs to be completed prior to her follow up?    Additional Information  • Has coughing or amount of mucous changed? Describe in comments     improving  • Are medicines or other measures helping the cough?     Inhaler helps  • Commented on: How long have your problems been going on?     Symptoms started in early October  Diagnosed with sinus infection which was treated with augmentin and then doxycycline  • Commented on: What else do you want to tell me about this problem?     No fevers, chest pain or difficulty breathing  Pulse ox in the 90's  Has been on 2 antibiotics for sinusitis and most recently was prescribed prednisone 50mg daily x5 days and an inhaler for bronchitis    Protocols used: Cough

## 2024-11-04 NOTE — TELEPHONE ENCOUNTER
CT chest angio is not enough.  Discussed with pt that CT c/a/p should be rescheduled as well as appt.  She states she will call back to reschedule once she is feeling better.

## 2024-11-06 ENCOUNTER — APPOINTMENT (OUTPATIENT)
Dept: RADIOLOGY | Facility: HOSPITAL | Age: 29
End: 2024-11-06
Payer: COMMERCIAL

## 2024-11-06 ENCOUNTER — APPOINTMENT (OUTPATIENT)
Dept: HEMATOLOGY/ONCOLOGY | Facility: HOSPITAL | Age: 29
End: 2024-11-06
Payer: COMMERCIAL

## 2024-11-06 ENCOUNTER — APPOINTMENT (OUTPATIENT)
Dept: RADIOLOGY | Facility: CLINIC | Age: 29
End: 2024-11-06
Payer: COMMERCIAL

## 2024-12-02 NOTE — PROGRESS NOTES
Patient ID: Elizabeth Wolff is a 28 y.o. female.  Primary Care Provider: Kita Liriano, AMADA-CNP    Subjective    HPI:  Referred by Dr. Acacia Wynne  PCP is Dr. Theodore    Metastatic rectal cancer    12/18/19 D&C with placement of IUD.  Pathology c/w proliferative endometrium    Cancer history  - June 2018.  Presented with rectal bleeding.    - 06/29/2018 - endoscopic biopsy of rectal mass consistent with moderately differentiated adenocarcinoma  - August 2018.  Biopsy of lung nodule consistent with metastatic disease from the colon  - August - November 2018 8 cycles FOLFOX  - October 2018.  CT chest, abdomen and pelvis - IMPRESSION: Marked decrease in size of metastatic pulmonary nodules. No discrete new suspicious pulmonary nodule. No findings to suggest metastatic disease in the abdomen or pelvis. Dictated by:   - 12/13/2018 MRI of the pelvis - There has been complete resolution of the previous described polypoid plaque-like mass involving the mid rectum. There is circumferential rectal wall thickening related to post radiation changes. No discrete measurable mass is identified. Previous described area of tumor invasion beyond the muscularis propria at the 7 o'clock position has also resolved. No abnormal diffusion restriction within the rectum. Circumferential resection margin is not threatened. No extramural vascular invasion      Medical History:  She denies history of medical problems    Surgical History:  No prior surgeries other than endoscopic biopsy    Social History:  She lives with her mother, her brother and his wife and their child.  She works for correctional facility.  She denies tobacco, alcohol or illicit drug use.    Obstetrics/Gynecology History:  G0.  Reports menses were regular prior to initiation of chemotherapy.     Family History:  MGM with breast cancer in 60s MGGF MA - colon cancer -- youngest age 56 Sister- bx pending for poss NHL. Paternal history negative. Distant maternal side  with multiple cancers: pancreatic       Timeline:  Event Description Onset Date   Chemotherapy Completion of 6 cycles Folfox 02-Apr-2020   Chemotherapy Folfox started with oxaliplatin reaction noted 06-Feb-2020   Chemotherapy CapOx stopped with poor tolerability 13-Jan-2020   Chemotherapy CapOx started with disease progression in lung 06-Jan-2020   Chemotherapy Treatment stopped per patient 18-Feb-2019   Chemotherapy FolFox restarted 04-Feb-2019   Chemotherapy Completion of 8 Folfox 30-Nov-2018   Chemotherapy FolFox Cycle #1 15-Aug-2018         Objective    Visit Vitals  /83 (BP Location: Right arm, Patient Position: Sitting, BP Cuff Size: Adult)   Pulse 80   Temp 36.8 °C (98.2 °F) (Temporal)   Resp 16        Interval history:  Patient is a 28 year old female with history of recurrent rectal cancer with persistent abnormal bleeding, with IUD placement in 2019.   Patient is here for an annual visit. Follows with [facility] regarding hx of rectal cancer, last seen 4/6/23. Patient is currently PAT and is to obtain CT scan every 6 months until 2025. Last PAP 3/25/22 was negative.   Patient had bronchitis and went to ER, she had CT Angio for PE which was inconclusive but her D-Dimer was high.  Patient had no further follow up.   She took inhaler and symptoms resolved.  Patient not having any breakthrough bleeding with IUD.  Patient has been sexually active, using condoms.  Patient has never received Gardasil vaccine.  Patient has CT scan and follow up with medical oncology end of December.  Denies any bowel or bladder issues.       Physical Exam:    Constitutional: Doing well. PAT  Eyes: PERRL  ENMT: Moist mucus membranes  Head/Neck: Supple. Symmetrical  Cardiovascular: Regular, rate and rhythm. 2+ equal pulses of the extremities  Respiratory/Thorax: CTA. RRR. Chest rise symmetrical.  Gastrointestinal: Non-distended, soft, non-tender  Genitourinary: Cervical os visualized with IUD strings,  no lesions, no CMT, small  mobile uterus, no adnexal masses noted. Smooth vaginal walls.  Vulva with no lesions noted.   Musculoskeletal: ROM intact, no joint swelling, normal strength  Extremities: No edema  Neurological: Alert and oriented x 3. Pleasant and cooperative.  Lymphatic: No lymphadenopathy. No lymphedema  Psychological: Appropriate mood and behavior  Skin: Warm and dry, no lesions, no rashes    A complete review of systems was performed and all systems were normal except what is noted in the interval history.      Assessment/Plan   Patient is a 28 year old female with history of recurrent rectal cancer with persistent abnormal bleeding, with IUD placement in 2019.  Well woman exam.        PLAN:  Pap today, F/U results  If negative F/U in 1 year or as needed  Encouraged to receive Gardasil vaccine  D-Dimer add on to labs, F/U results

## 2024-12-03 ENCOUNTER — OFFICE VISIT (OUTPATIENT)
Dept: GYNECOLOGIC ONCOLOGY | Facility: CLINIC | Age: 29
End: 2024-12-03
Payer: COMMERCIAL

## 2024-12-03 VITALS
WEIGHT: 293 LBS | SYSTOLIC BLOOD PRESSURE: 134 MMHG | BODY MASS INDEX: 50.39 KG/M2 | HEART RATE: 80 BPM | RESPIRATION RATE: 16 BRPM | OXYGEN SATURATION: 99 % | DIASTOLIC BLOOD PRESSURE: 83 MMHG | TEMPERATURE: 98.2 F

## 2024-12-03 DIAGNOSIS — R79.89 ELEVATED D-DIMER: ICD-10-CM

## 2024-12-03 DIAGNOSIS — Z01.419 ENCOUNTER FOR WELL WOMAN EXAM: Primary | ICD-10-CM

## 2024-12-03 PROCEDURE — 99395 PREV VISIT EST AGE 18-39: CPT | Performed by: NURSE PRACTITIONER

## 2024-12-03 PROCEDURE — 99214 OFFICE O/P EST MOD 30 MIN: CPT | Performed by: NURSE PRACTITIONER

## 2024-12-03 ASSESSMENT — PAIN SCALES - GENERAL: PAINLEVEL_OUTOF10: 0-NO PAIN

## 2024-12-12 ENCOUNTER — TELEPHONE (OUTPATIENT)
Dept: GYNECOLOGIC ONCOLOGY | Facility: HOSPITAL | Age: 29
End: 2024-12-12

## 2024-12-12 LAB
CYTOLOGY CMNT CVX/VAG CYTO-IMP: NORMAL
HPV HR 12 DNA GENITAL QL NAA+PROBE: POSITIVE
HPV HR GENOTYPES PNL CVX NAA+PROBE: POSITIVE
HPV16 DNA SPEC QL NAA+PROBE: NEGATIVE
HPV18 DNA SPEC QL NAA+PROBE: NEGATIVE
LAB AP HPV GENOTYPE QUESTION: YES
LAB AP HPV HR: NORMAL
LABORATORY COMMENT REPORT: NORMAL
PATH REPORT.TOTAL CANCER: NORMAL

## 2024-12-19 ENCOUNTER — TELEPHONE (OUTPATIENT)
Dept: ADMISSION | Facility: HOSPITAL | Age: 29
End: 2024-12-19
Payer: COMMERCIAL

## 2024-12-19 DIAGNOSIS — C78.02 MALIGNANT NEOPLASM METASTATIC TO LEFT LUNG (MULTI): ICD-10-CM

## 2024-12-19 RX ORDER — PREDNISONE 50 MG/1
TABLET ORAL
Qty: 3 TABLET | Refills: 3 | Status: SHIPPED | OUTPATIENT
Start: 2024-12-19

## 2024-12-19 NOTE — TELEPHONE ENCOUNTER
Patient called and states she is scheduled for CT Scan tomorrow at 0800.  Needing pre-meds prior to CT Scan.  Please send to [facility] in [place] on file.

## 2024-12-20 ENCOUNTER — TELEPHONE (OUTPATIENT)
Dept: HEMATOLOGY/ONCOLOGY | Facility: HOSPITAL | Age: 29
End: 2024-12-20
Payer: COMMERCIAL

## 2024-12-20 ENCOUNTER — HOSPITAL ENCOUNTER (OUTPATIENT)
Dept: RADIOLOGY | Facility: HOSPITAL | Age: 29
Discharge: HOME | End: 2024-12-20
Payer: COMMERCIAL

## 2024-12-20 DIAGNOSIS — C20 PRIMARY MALIGNANT NEOPLASM OF RECTUM (MULTI): Primary | ICD-10-CM

## 2024-12-20 DIAGNOSIS — C20 PRIMARY MALIGNANT NEOPLASM OF RECTUM (MULTI): ICD-10-CM

## 2024-12-20 NOTE — TELEPHONE ENCOUNTER
We should be able to have a line appointment before a scan at [facility] if/when that is necessary- it just needs to be scheduled.

## 2024-12-20 NOTE — TELEPHONE ENCOUNTER
Patient calls to let us know they were unsuccessful in getting her line started for her CT today.   She did not have her CT.    She went to the [place] location.  They could not get an IV and they did not have anyone to access her port per her report.    She also takes premeds which will need to be re ordered.  She takes benadryl and prednisone.    She will reschedule her scan at [facility] and will need a port access appt  prior.      Please advise  Message sent

## 2024-12-26 ENCOUNTER — APPOINTMENT (OUTPATIENT)
Dept: HEMATOLOGY/ONCOLOGY | Facility: HOSPITAL | Age: 29
End: 2024-12-26
Payer: COMMERCIAL

## 2025-01-23 DIAGNOSIS — C20 MALIGNANT NEOPLASM OF RECTUM (MULTI): Primary | ICD-10-CM

## 2025-01-23 RX ORDER — HEPARIN SODIUM,PORCINE/PF 10 UNIT/ML
50 SYRINGE (ML) INTRAVENOUS AS NEEDED
OUTPATIENT
Start: 2025-01-23

## 2025-01-23 RX ORDER — HEPARIN 100 UNIT/ML
500 SYRINGE INTRAVENOUS AS NEEDED
OUTPATIENT
Start: 2025-01-23

## 2025-01-24 ENCOUNTER — HOSPITAL ENCOUNTER (OUTPATIENT)
Dept: RADIOLOGY | Facility: HOSPITAL | Age: 30
Discharge: HOME | End: 2025-01-24
Payer: COMMERCIAL

## 2025-01-24 ENCOUNTER — LAB (OUTPATIENT)
Dept: HEMATOLOGY/ONCOLOGY | Facility: HOSPITAL | Age: 30
End: 2025-01-24
Payer: COMMERCIAL

## 2025-01-24 DIAGNOSIS — C20 PRIMARY MALIGNANT NEOPLASM OF RECTUM (MULTI): ICD-10-CM

## 2025-01-24 DIAGNOSIS — C20 MALIGNANT NEOPLASM OF RECTUM (MULTI): ICD-10-CM

## 2025-01-24 LAB
CREAT SERPL-MCNC: 0.54 MG/DL (ref 0.5–1.05)
EGFRCR SERPLBLD CKD-EPI 2021: >90 ML/MIN/1.73M*2

## 2025-01-24 PROCEDURE — 82565 ASSAY OF CREATININE: CPT

## 2025-01-24 PROCEDURE — 2500000004 HC RX 250 GENERAL PHARMACY W/ HCPCS (ALT 636 FOR OP/ED): Performed by: RADIOLOGY

## 2025-01-24 PROCEDURE — 2550000001 HC RX 255 CONTRASTS: Performed by: INTERNAL MEDICINE

## 2025-01-24 PROCEDURE — 71260 CT THORAX DX C+: CPT

## 2025-01-24 PROCEDURE — 36591 DRAW BLOOD OFF VENOUS DEVICE: CPT

## 2025-01-24 RX ORDER — HEPARIN SODIUM,PORCINE/PF 10 UNIT/ML
50 SYRINGE (ML) INTRAVENOUS AS NEEDED
OUTPATIENT
Start: 2025-01-24

## 2025-01-24 RX ORDER — HEPARIN 100 UNIT/ML
500 SYRINGE INTRAVENOUS AS NEEDED
OUTPATIENT
Start: 2025-01-24

## 2025-01-24 RX ORDER — HEPARIN 100 UNIT/ML
500 SYRINGE INTRAVENOUS AS NEEDED
Status: DISCONTINUED | OUTPATIENT
Start: 2025-01-24 | End: 2025-01-25 | Stop reason: HOSPADM

## 2025-01-24 RX ADMIN — Medication 500 UNITS: at 08:28

## 2025-01-24 RX ADMIN — IOHEXOL 80 ML: 350 INJECTION, SOLUTION INTRAVENOUS at 08:24

## 2025-01-30 ENCOUNTER — OFFICE VISIT (OUTPATIENT)
Dept: HEMATOLOGY/ONCOLOGY | Facility: HOSPITAL | Age: 30
End: 2025-01-30
Payer: COMMERCIAL

## 2025-01-30 VITALS
BODY MASS INDEX: 49.96 KG/M2 | TEMPERATURE: 97.2 F | OXYGEN SATURATION: 100 % | WEIGHT: 293 LBS | DIASTOLIC BLOOD PRESSURE: 54 MMHG | HEART RATE: 82 BPM | RESPIRATION RATE: 20 BRPM | SYSTOLIC BLOOD PRESSURE: 133 MMHG

## 2025-01-30 DIAGNOSIS — C20 PRIMARY MALIGNANT NEOPLASM OF RECTUM (MULTI): ICD-10-CM

## 2025-01-30 PROCEDURE — 99213 OFFICE O/P EST LOW 20 MIN: CPT | Performed by: INTERNAL MEDICINE

## 2025-01-30 PROCEDURE — 1036F TOBACCO NON-USER: CPT | Performed by: INTERNAL MEDICINE

## 2025-01-30 ASSESSMENT — PAIN SCALES - GENERAL: PAINLEVEL_OUTOF10: 0-NO PAIN

## 2025-01-30 NOTE — PROGRESS NOTES
Patient ID: Elizabeth Wolff is a 29 y.o. female from [place], [place].     Referring Physician: Dagoberto Platt MD  [address] [place] [address]    Primary Care Provider: AMADA Caban-CNP    Cancer History:          Colon and Rectum         AJCC Edition: 8th (AJCC), Diagnosis Date: Jun 2018, ALAN, T3 N1 M1a      Treatment Synopsis:    At 22-years of age she was  referred to [facility] for evaluation of rectal bleeding for  1 month. . She went to emergency room with  a rectal exam was done and she was told she has small external hemorrhoids. She did have significant perianal discomfort. She had a flex/sig, with Dr. Lozano, on 6/29/18, that showed: A fungating non-obstructing medium-sized mass was found in the proximal  rectum. The mass was non-circumferential. The mass measured seven cm in length. No bleeding was present.   MRI described a lesion in the mid rectum with angela involvement suspected.   CTs done revealed pulmonary nodules with bx done 8/7/18 confirming metastatic disease.  MS- Stable   NGS  RESULT: Positive for KRAS mutation. Negative for NRAS mutation. Negative for BRAF mutation. VARIANTS DETECTED: KRAS p.G12D (c.35G>A) TP53 p.V272E (c.815T>A) INTERPRETATION: KRAS p.G12D Gain-of-function   FDA/NCCN RECOMMENDATION: Resistance to Panitumumab and Cetuximab CLINICAL TRIALS: Ribociclib + Trametinib ([id number]); Trametinib + Navitoclax ([id number])      CEA was done and was normal      FAMILY HISTORY  MGM with breast cancer in 60s  MGGF MA - colon cancer -- youngest age 56  Sister- bx pending for poss NHL.  Paternal history negative (poor knowledge base)  Distant maternal side with multiple cancers: pancreatic      She is heterozygous for Rad50 gene  *PALB2 Variant of Uncertain Significance (VUS)*:     Treatment History:          2018-8-15: Chemotherapy: FolFox Cycle #1     2018-11-30: Chemotherapy: Completion of 8 Folfox     2019-2-4: Chemotherapy: FolFox restarted      2019-2-18: Chemotherapy: Treatment stopped per patient     2020-1-6: Chemotherapy: CapOx started with disease progression in lung     2020-1-13: Chemotherapy: CapOx stopped with poor tolerability     2020-2-6: Chemotherapy: Folfox started with oxaliplatin reaction noted     2020-4-2: Chemotherapy: Completion of 6 cycles Folfox       Past Medical History: Elizabeth has no past medical history on file.  Surgical History:  Elizabeth has a past surgical history that includes Other surgical history (04/27/2020); Other surgical history (08/17/2020); and Other surgical history (08/17/2020).  Social History:  Elizabeth reports that she has never smoked. She has never been exposed to tobacco smoke. She has never used smokeless tobacco. She reports that she does not currently use alcohol.  Family History:    Family History   Problem Relation Name Age of Onset    Colon cancer Mother's Brother      Skin cancer Maternal Grandfather      Breast cancer Other Maternal Relatives     Colon cancer Other Maternal Relatives     Pancreatic cancer Other Maternal Relatives      Family Oncology History:  Cancer-related family history includes Breast cancer in an other family member; Colon cancer in her mother's brother and another family member; Pancreatic cancer in an other family member; Skin cancer in her maternal grandfather.        SUBJECTIVE:    History of Present Illness:  Elizabeth Wolff is a 29 y.o. 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         OBJECTIVE:    VS / Pain:  /54   Pulse 82   Temp 36.2 °C (97.2 °F) (Core)   Resp 20   Wt 140 kg (309 lb 8.4 oz)   SpO2 100%   BMI 49.96 kg/m²   BSA: 2.55 meters squared   Pain Scale: 0      Daily Weight  01/30/25 : 140 kg (309 lb 8.4 oz)  12/03/24 : 142 kg (312 lb 2.7 oz)  04/25/24 : 144 kg (317 lb 9.6 oz)  10/31/23 : 141 kg (310 lb)  10/31/23 : 141 kg (309 lb 15.5  oz)        Physical Exam  Constitutional:       Appearance: Normal appearance.   HENT:      Nose: Nose normal.      Mouth/Throat:      Mouth: Mucous membranes are moist.   Eyes:      Extraocular Movements: Extraocular movements intact.      Conjunctiva/sclera: Conjunctivae normal.   Neck:      Comments: Skin over mediport is thin.  Hx of previous mediport erosion.  Cardiovascular:      Rate and Rhythm: Normal rate.   Musculoskeletal:         General: Normal range of motion.      Cervical back: Normal range of motion.   Skin:     General: Skin is warm.   Neurological:      General: No focal deficit present.         Performance Status:       Diagnostic Results     WBC   Date/Time Value Ref Range Status   04/16/2024 12:38 PM 8.5 4.4 - 11.3 x10*3/uL Final   01/04/2024 12:58 PM 9.0 4.4 - 11.3 x10*3/uL Final   11/01/2023 03:28 AM 11.3 4.4 - 11.3 x10*3/uL Final     Hemoglobin   Date Value Ref Range Status   04/16/2024 14.7 12.0 - 16.0 g/dL Final   01/04/2024 13.0 12.0 - 16.0 g/dL Final   11/01/2023 13.2 12.0 - 16.0 g/dL Final     MCV   Date/Time Value Ref Range Status   04/16/2024 12:38 PM 83 80 - 100 fL Final   01/04/2024 12:58 PM 83 80 - 100 fL Final   11/01/2023 03:28 AM 82 80 - 100 fL Final     Platelets   Date/Time Value Ref Range Status   04/16/2024 12:38  150 - 450 x10*3/uL Final   01/04/2024 12:58  150 - 450 x10*3/uL Final   11/01/2023 03:28  150 - 450 x10*3/uL Final     Neutrophils Absolute   Date/Time Value Ref Range Status   04/16/2024 12:38 PM 4.90 1.20 - 7.70 x10*3/uL Final     Comment:     Percent differential counts (%) should be interpreted in the context of the absolute cell counts (cells/uL).   01/04/2024 12:58 PM 5.88 1.20 - 7.70 x10*3/uL Final     Comment:     Percent differential counts (%) should be interpreted in the context of the absolute cell counts (cells/uL).   11/01/2023 03:28 AM 5.90 1.20 - 7.70 x10*3/uL Final     Comment:     Percent differential counts (%) should be  "interpreted in the context of the absolute cell counts (cells/uL).     Bilirubin, Total   Date/Time Value Ref Range Status   04/16/2024 12:38 PM 0.4 0.0 - 1.2 mg/dL Final   01/04/2024 12:58 PM 0.4 0.0 - 1.2 mg/dL Final   11/01/2023 03:28 AM 0.4 0.0 - 1.2 mg/dL Final     AST   Date/Time Value Ref Range Status   04/16/2024 12:38 PM 22 9 - 39 U/L Final     Comment:     MILD HEMOLYSIS DETECTED. The result may be falsely elevated due to hemolysis or other interferents. Clinical correlation is recommended. Repeat testing may be considered.   01/04/2024 12:58 PM 12 9 - 39 U/L Final   11/01/2023 03:28 AM 15 9 - 39 U/L Final     ALT   Date/Time Value Ref Range Status   04/16/2024 12:38 PM 14 7 - 45 U/L Final     Comment:     Patients treated with Sulfasalazine may generate falsely decreased results for ALT.   01/04/2024 12:58 PM 11 7 - 45 U/L Final     Comment:     Patients treated with Sulfasalazine may generate falsely decreased results for ALT.   11/01/2023 03:28 AM 13 7 - 45 U/L Final     Comment:     Patients treated with Sulfasalazine may generate falsely decreased results for ALT.     Creatinine   Date/Time Value Ref Range Status   01/24/2025 07:55 AM 0.54 0.50 - 1.05 mg/dL Final   04/16/2024 12:38 PM 0.69 0.50 - 1.05 mg/dL Final   01/04/2024 12:58 PM 0.65 0.50 - 1.05 mg/dL Final     Urea Nitrogen   Date/Time Value Ref Range Status   04/16/2024 12:38 PM 14 6 - 23 mg/dL Final   01/04/2024 12:58 PM 11 6 - 23 mg/dL Final   11/01/2023 03:28 AM 11 6 - 23 mg/dL Final     Albumin   Date/Time Value Ref Range Status   04/16/2024 12:38 PM 4.3 3.4 - 5.0 g/dL Final   01/04/2024 12:58 PM 4.0 3.4 - 5.0 g/dL Final   11/01/2023 03:28 AM 4.3 3.4 - 5.0 g/dL Final     No results found for: \"\"  Carcinoembryonic AG   Date/Time Value Ref Range Status   04/16/2024 12:38 PM <0.5 ug/L Final   01/04/2024 12:58 PM <0.5 ug/L Final     === 12/20/24 ===    CT CHEST ABDOMEN PELVIS W IV CONTRAST    - Impression -  Restaging scan of moderately " "differentiated rectal adenocarcinoma  with comparison CT dated 04/19/2024.  1. Stable postoperative changes of left upper lobectomy without  definite evidence of locoregional recurrence or metastatic disease in  the chest, abdomen, or pelvis.  2. Interval development of left lower lobe pulmonary nodule measuring  0.7 cm and associated mild ground-glass opacities. Findings likely  reflect a component of mucous plugging/atelectasis however consider  low-dose CT at three-months to assess for resolution.    I personally reviewed the images/study and I agree with the findings  as stated by Gayle Garcia MD. This study was interpreted at  [place], [place].    MACRO:  Critical Finding:  See findings. Notification was initiated on  1/24/2025 at 4:52 pm by  Gayle Garcia.  (**-YCF-**) Instructions:    Signed by: Quique Jo 1/24/2025 9:52 PM  Dictation workstation:   [id number]          Assessment/Plan   Assessment and Plan:   Assessment:    1. Stage IV rectal cancer:        presenting with lung nodules and a rectal mass. She had complete response after 9 infusions of FOLFOX with no rectal mass identified by endoscopy. November 2019, she had growth of tumor in the left upper lobe and chemo was restarted. She was intolerant  of CapOX but completed 6 infusions of FOLFOX. Of note, she had an oxaliplatin reaction and requires a prolonged infusion time.     She did have a bronchoscopy and thoracotomy on the left side in July 2020 after 15 rounds of chemotherapy.      2020 endoscopy showing only \"scar\" at site of prior tumor. No bx was taken. She is therefor PAT and i would strongly advocate for close attention to the primary tumor site as she is most at risk for local recurrence.      Of note, imaging for her is likely to be complicated as she has had significant reactions to CT dye even with the addition of premeds. She described her last reaction as just feeling " "\"tingly\"   She also has a history of a reaction to MRI dye though not premedicated. MRI event-- respiratory with throat symptoms.     Recall she is SHERIDAN, Kras mutated (NRAS unmutated) and noted to have RAD50 gene.  Discussed clonoseq type testing vs Signatera to evaluate for any occult evidence of circulating tumor. Will follow with current testing and with any abnormalities this will be done     MRI rectum in 2/2023 was negative.  Flex sig negative in 2/2023      Plan:   Rectal cancer:  PAT  -next Flex sig 9/2024  -CT every 6 mo for now until at least 2025, next is due in 10/2024.  Obtain over the next month and follow up.  - Negative Signatera 9/2023, repeat Q 3 months x 2 years.  - Continue port flushes at [facility] for port removal when pt agrees.      2. Mediport care:  Ongoing care. Some flow issue noted.      3. Premature menopausal symptoms:   She has a Mirena in place and now without regular menstrual cycles. Some concern about her breast cancer risk with RAD50 gene. Will be attentive to yearly screening. Has follow up yearly with GYN in [place]     4. obesity:  This is an ongoing issue and somewhat limits exam. May also put her at increased risk from cancer perspective.        5. Genetics  Rad50 mutation noted-- may predict risk for Colon, Breast and ovarian.    Dagoberto Platt MD  "

## 2025-03-14 ENCOUNTER — HOSPITAL ENCOUNTER (OUTPATIENT)
Dept: RADIOLOGY | Facility: CLINIC | Age: 30
Discharge: HOME | End: 2025-03-14
Payer: COMMERCIAL

## 2025-03-14 DIAGNOSIS — C20 PRIMARY MALIGNANT NEOPLASM OF RECTUM (MULTI): ICD-10-CM

## 2025-03-14 PROCEDURE — 71250 CT THORAX DX C-: CPT

## 2025-03-20 ENCOUNTER — LAB (OUTPATIENT)
Dept: HEMATOLOGY/ONCOLOGY | Facility: HOSPITAL | Age: 30
End: 2025-03-20
Payer: COMMERCIAL

## 2025-03-20 ENCOUNTER — OFFICE VISIT (OUTPATIENT)
Dept: HEMATOLOGY/ONCOLOGY | Facility: HOSPITAL | Age: 30
End: 2025-03-20
Payer: COMMERCIAL

## 2025-03-20 VITALS
HEART RATE: 72 BPM | TEMPERATURE: 97.3 F | OXYGEN SATURATION: 100 % | SYSTOLIC BLOOD PRESSURE: 110 MMHG | DIASTOLIC BLOOD PRESSURE: 71 MMHG | RESPIRATION RATE: 20 BRPM | WEIGHT: 293 LBS | BODY MASS INDEX: 49.67 KG/M2

## 2025-03-20 DIAGNOSIS — C20 PRIMARY MALIGNANT NEOPLASM OF RECTUM (MULTI): ICD-10-CM

## 2025-03-20 LAB
ALBUMIN SERPL BCP-MCNC: 4.1 G/DL (ref 3.4–5)
ALP SERPL-CCNC: 78 U/L (ref 33–110)
ALT SERPL W P-5'-P-CCNC: 11 U/L (ref 7–45)
ANION GAP SERPL CALC-SCNC: 12 MMOL/L (ref 10–20)
AST SERPL W P-5'-P-CCNC: 11 U/L (ref 9–39)
BASOPHILS # BLD AUTO: 0.05 X10*3/UL (ref 0–0.1)
BASOPHILS NFR BLD AUTO: 0.6 %
BILIRUB SERPL-MCNC: 0.4 MG/DL (ref 0–1.2)
BUN SERPL-MCNC: 14 MG/DL (ref 6–23)
CALCIUM SERPL-MCNC: 9.1 MG/DL (ref 8.6–10.3)
CEA SERPL-MCNC: <0.5 UG/L
CHLORIDE SERPL-SCNC: 106 MMOL/L (ref 98–107)
CO2 SERPL-SCNC: 26 MMOL/L (ref 21–32)
CREAT SERPL-MCNC: 0.67 MG/DL (ref 0.5–1.05)
EGFRCR SERPLBLD CKD-EPI 2021: >90 ML/MIN/1.73M*2
EOSINOPHIL # BLD AUTO: 0.1 X10*3/UL (ref 0–0.7)
EOSINOPHIL NFR BLD AUTO: 1.2 %
ERYTHROCYTE [DISTWIDTH] IN BLOOD BY AUTOMATED COUNT: 13.7 % (ref 11.5–14.5)
GLUCOSE SERPL-MCNC: 103 MG/DL (ref 74–99)
HCT VFR BLD AUTO: 39 % (ref 36–46)
HGB BLD-MCNC: 12.6 G/DL (ref 12–16)
IMM GRANULOCYTES # BLD AUTO: 0.03 X10*3/UL (ref 0–0.7)
IMM GRANULOCYTES NFR BLD AUTO: 0.4 % (ref 0–0.9)
LYMPHOCYTES # BLD AUTO: 2.62 X10*3/UL (ref 1.2–4.8)
LYMPHOCYTES NFR BLD AUTO: 32.3 %
MCH RBC QN AUTO: 26.1 PG (ref 26–34)
MCHC RBC AUTO-ENTMCNC: 32.3 G/DL (ref 32–36)
MCV RBC AUTO: 81 FL (ref 80–100)
MONOCYTES # BLD AUTO: 0.38 X10*3/UL (ref 0.1–1)
MONOCYTES NFR BLD AUTO: 4.7 %
NEUTROPHILS # BLD AUTO: 4.94 X10*3/UL (ref 1.2–7.7)
NEUTROPHILS NFR BLD AUTO: 60.8 %
NRBC BLD-RTO: 0 /100 WBCS (ref 0–0)
PLATELET # BLD AUTO: 249 X10*3/UL (ref 150–450)
POTASSIUM SERPL-SCNC: 3.9 MMOL/L (ref 3.5–5.3)
PROT SERPL-MCNC: 6.8 G/DL (ref 6.4–8.2)
RBC # BLD AUTO: 4.83 X10*6/UL (ref 4–5.2)
SODIUM SERPL-SCNC: 140 MMOL/L (ref 136–145)
WBC # BLD AUTO: 8.1 X10*3/UL (ref 4.4–11.3)

## 2025-03-20 PROCEDURE — 36591 DRAW BLOOD OFF VENOUS DEVICE: CPT

## 2025-03-20 PROCEDURE — 99214 OFFICE O/P EST MOD 30 MIN: CPT | Performed by: INTERNAL MEDICINE

## 2025-03-20 PROCEDURE — 85025 COMPLETE CBC W/AUTO DIFF WBC: CPT

## 2025-03-20 PROCEDURE — 1036F TOBACCO NON-USER: CPT | Performed by: INTERNAL MEDICINE

## 2025-03-20 PROCEDURE — 80053 COMPREHEN METABOLIC PANEL: CPT

## 2025-03-20 PROCEDURE — 2500000004 HC RX 250 GENERAL PHARMACY W/ HCPCS (ALT 636 FOR OP/ED): Performed by: NURSE PRACTITIONER

## 2025-03-20 PROCEDURE — 82378 CARCINOEMBRYONIC ANTIGEN: CPT

## 2025-03-20 RX ORDER — HEPARIN 100 UNIT/ML
500 SYRINGE INTRAVENOUS AS NEEDED
Status: DISCONTINUED | OUTPATIENT
Start: 2025-03-20 | End: 2025-03-20 | Stop reason: HOSPADM

## 2025-03-20 RX ORDER — HEPARIN SODIUM,PORCINE/PF 10 UNIT/ML
50 SYRINGE (ML) INTRAVENOUS AS NEEDED
OUTPATIENT
Start: 2025-03-20

## 2025-03-20 RX ORDER — HEPARIN 100 UNIT/ML
500 SYRINGE INTRAVENOUS AS NEEDED
OUTPATIENT
Start: 2025-03-20

## 2025-03-20 RX ADMIN — HEPARIN 500 UNITS: 100 SYRINGE at 09:51

## 2025-03-20 ASSESSMENT — PAIN SCALES - GENERAL: PAINLEVEL_OUTOF10: 0-NO PAIN

## 2025-03-20 NOTE — PROGRESS NOTES
Patient ID: Elizabeth Wolff is a 29 y.o. female from [place], [place].     Referring Physician: Dagoberto Platt [place]  [address] [place] [address]    Primary Care Provider: AMADA Caban-CNP    Cancer History:          Colon and Rectum         AJCC Edition: 8th (AJCC), Diagnosis Date: Jun 2018, ALAN, T3 N1 M1a      Treatment Synopsis:    At 22-years of age she was  referred to [facility] for evaluation of rectal bleeding for  1 month. . She went to emergency room with  a rectal exam was done and she was told she has small external hemorrhoids. She did have significant perianal discomfort. She had a flex/sig, with Dr. Lozano, on 6/29/18, that showed: A fungating non-obstructing medium-sized mass was found in the proximal  rectum. The mass was non-circumferential. The mass measured seven cm in length. No bleeding was present.   MRI described a lesion in the mid rectum with angela involvement suspected.   CTs done revealed pulmonary nodules with bx done 8/7/18 confirming metastatic disease.  MS- Stable   NGS  RESULT: Positive for KRAS mutation. Negative for NRAS mutation. Negative for BRAF mutation. VARIANTS DETECTED: KRAS p.G12D (c.35G>A) TP53 p.V272E (c.815T>A) INTERPRETATION: KRAS p.G12D Gain-of-function   FDA/NCCN RECOMMENDATION: Resistance to Panitumumab and Cetuximab CLINICAL TRIALS: Ribociclib + Trametinib ([id number]); Trametinib + Navitoclax ([id number])      CEA was done and was normal      FAMILY HISTORY  MGM with breast cancer in 60s  MGGF MA - colon cancer -- youngest age 56  Sister- bx pending for poss NHL.  Paternal history negative (poor knowledge base)  Distant maternal side with multiple cancers: pancreatic      She is heterozygous for Rad50 gene  *PALB2 Variant of Uncertain Significance (VUS)*:     Treatment History:          2018-8-15: Chemotherapy: FolFox Cycle #1     2018-11-30: Chemotherapy: Completion of 8 Folfox     2019-2-4: Chemotherapy: FolFox restarted      2019-2-18: Chemotherapy: Treatment stopped per patient     2020-1-6: Chemotherapy: CapOx started with disease progression in lung     2020-1-13: Chemotherapy: CapOx stopped with poor tolerability     2020-2-6: Chemotherapy: Folfox started with oxaliplatin reaction noted     2020-4-2: Chemotherapy: Completion of 6 cycles Folfox       Past Medical History: Elizabeth has no past medical history on file.  Surgical History:  Elizabeth has a past surgical history that includes Other surgical history (04/27/2020); Other surgical history (08/17/2020); and Other surgical history (08/17/2020).  Social History:  Elizabeth reports that she has never smoked. She has never been exposed to tobacco smoke. She has never used smokeless tobacco. She reports that she does not currently use alcohol.  Family History:    Family History   Problem Relation Name Age of Onset    Colon cancer Mother's Brother      Skin cancer Maternal Grandfather      Breast cancer Other Maternal Relatives     Colon cancer Other Maternal Relatives     Pancreatic cancer Other Maternal Relatives      Family Oncology History:  Cancer-related family history includes Breast cancer in an other family member; Colon cancer in her mother's brother and another family member; Pancreatic cancer in an other family member; Skin cancer in her maternal grandfather.        SUBJECTIVE:    History of Present Illness:  Elizabeth Wolff is a 29 y.o. 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       OBJECTIVE:    VS / Pain:  /71   Pulse 72   Temp 36.3 °C (97.3 °F) (Core)   Resp 20   Wt 140 kg (307 lb 12.2 oz)   SpO2 100%   BMI 49.67 kg/m²   BSA: 2.55 meters squared   Pain Scale: 0      Daily Weight  03/20/25 : 140 kg (307 lb 12.2 oz)  01/30/25 : 140 kg (309 lb 8.4 oz)  12/03/24 : 142 kg (312 lb 2.7 oz)  04/25/24 : 144 kg (317 lb 9.6 oz)  10/31/23 : 141 kg (310  lb)        Physical Exam  Constitutional:       Appearance: Normal appearance.   HENT:      Nose: Nose normal.      Mouth/Throat:      Mouth: Mucous membranes are moist.   Eyes:      Extraocular Movements: Extraocular movements intact.      Conjunctiva/sclera: Conjunctivae normal.   Neck:      Comments: Skin over mediport is thin.  Hx of previous mediport erosion.  Cardiovascular:      Rate and Rhythm: Normal rate.   Musculoskeletal:         General: Normal range of motion.      Cervical back: Normal range of motion.   Skin:     General: Skin is warm.   Neurological:      General: No focal deficit present.       Performance Status:       Diagnostic Results     WBC   Date/Time Value Ref Range Status   03/20/2025 09:50 AM 8.1 4.4 - 11.3 x10*3/uL Final   04/16/2024 12:38 PM 8.5 4.4 - 11.3 x10*3/uL Final   01/04/2024 12:58 PM 9.0 4.4 - 11.3 x10*3/uL Final     Hemoglobin   Date Value Ref Range Status   03/20/2025 12.6 12.0 - 16.0 g/dL Final   04/16/2024 14.7 12.0 - 16.0 g/dL Final   01/04/2024 13.0 12.0 - 16.0 g/dL Final     MCV   Date/Time Value Ref Range Status   03/20/2025 09:50 AM 81 80 - 100 fL Final   04/16/2024 12:38 PM 83 80 - 100 fL Final   01/04/2024 12:58 PM 83 80 - 100 fL Final     Platelets   Date/Time Value Ref Range Status   03/20/2025 09:50  150 - 450 x10*3/uL Final   04/16/2024 12:38  150 - 450 x10*3/uL Final   01/04/2024 12:58  150 - 450 x10*3/uL Final     Neutrophils Absolute   Date/Time Value Ref Range Status   03/20/2025 09:50 AM 4.94 1.20 - 7.70 x10*3/uL Final     Comment:     Percent differential counts (%) should be interpreted in the context of the absolute cell counts (cells/uL).   04/16/2024 12:38 PM 4.90 1.20 - 7.70 x10*3/uL Final     Comment:     Percent differential counts (%) should be interpreted in the context of the absolute cell counts (cells/uL).   01/04/2024 12:58 PM 5.88 1.20 - 7.70 x10*3/uL Final     Comment:     Percent differential counts (%) should be  "interpreted in the context of the absolute cell counts (cells/uL).     Bilirubin, Total   Date/Time Value Ref Range Status   04/16/2024 12:38 PM 0.4 0.0 - 1.2 mg/dL Final   01/04/2024 12:58 PM 0.4 0.0 - 1.2 mg/dL Final   11/01/2023 03:28 AM 0.4 0.0 - 1.2 mg/dL Final     AST   Date/Time Value Ref Range Status   04/16/2024 12:38 PM 22 9 - 39 U/L Final     Comment:     MILD HEMOLYSIS DETECTED. The result may be falsely elevated due to hemolysis or other interferents. Clinical correlation is recommended. Repeat testing may be considered.   01/04/2024 12:58 PM 12 9 - 39 U/L Final   11/01/2023 03:28 AM 15 9 - 39 U/L Final     ALT   Date/Time Value Ref Range Status   04/16/2024 12:38 PM 14 7 - 45 U/L Final     Comment:     Patients treated with Sulfasalazine may generate falsely decreased results for ALT.   01/04/2024 12:58 PM 11 7 - 45 U/L Final     Comment:     Patients treated with Sulfasalazine may generate falsely decreased results for ALT.   11/01/2023 03:28 AM 13 7 - 45 U/L Final     Comment:     Patients treated with Sulfasalazine may generate falsely decreased results for ALT.     Creatinine   Date/Time Value Ref Range Status   01/24/2025 07:55 AM 0.54 0.50 - 1.05 mg/dL Final   04/16/2024 12:38 PM 0.69 0.50 - 1.05 mg/dL Final   01/04/2024 12:58 PM 0.65 0.50 - 1.05 mg/dL Final     Urea Nitrogen   Date/Time Value Ref Range Status   04/16/2024 12:38 PM 14 6 - 23 mg/dL Final   01/04/2024 12:58 PM 11 6 - 23 mg/dL Final   11/01/2023 03:28 AM 11 6 - 23 mg/dL Final     Albumin   Date/Time Value Ref Range Status   04/16/2024 12:38 PM 4.3 3.4 - 5.0 g/dL Final   01/04/2024 12:58 PM 4.0 3.4 - 5.0 g/dL Final   11/01/2023 03:28 AM 4.3 3.4 - 5.0 g/dL Final     No results found for: \"\"  Carcinoembryonic AG   Date/Time Value Ref Range Status   04/16/2024 12:38 PM <0.5 ug/L Final   01/04/2024 12:58 PM <0.5 ug/L Final     === 12/20/24 ===    CT CHEST ABDOMEN PELVIS W IV CONTRAST    - Impression -  Restaging scan of moderately " differentiated rectal adenocarcinoma  with comparison CT dated 04/19/2024.  1. Stable postoperative changes of left upper lobectomy without  definite evidence of locoregional recurrence or metastatic disease in  the chest, abdomen, or pelvis.  2. Interval development of left lower lobe pulmonary nodule measuring  0.7 cm and associated mild ground-glass opacities. Findings likely  reflect a component of mucous plugging/atelectasis however consider  low-dose CT at three-months to assess for resolution.    I personally reviewed the images/study and I agree with the findings  as stated by Gayle Garcia MD. This study was interpreted at  [place], [place].    MACRO:  Critical Finding:  See findings. Notification was initiated on  1/24/2025 at 4:52 pm by  Gayle Garcia.  (**-YCF-**) Instructions:    Signed by: Quique Jo 1/24/2025 9:52 PM  Dictation workstation:   [id number]    === 03/14/25 ===    CT CHEST WO IV CONTRAST    - Impression -  1.  The previously noted left lower lobe nodular density remains present although may be slightly less conspicuous compared to prior  imaging suggesting this may be a partially resolving infectious or  inflammatory process. Continued short-term follow-up/surveillance to  resolution is recommended.  2. There does appear to be a new or developing vague 7 mm round ground-glass nodule in the right lung base on current study. This can  also be reassessed with short-term imaging follow-up in 3 months.    MACRO:  None    Signed by: Rodri Cruz 3/16/2025 10:16 AM  Dictation workstation:   [id number]          Assessment/Plan   Assessment and Plan:   Assessment:    1. Stage IV rectal cancer:        presenting with lung nodules and a rectal mass. She had complete response after 9 infusions of FOLFOX with no rectal mass identified by endoscopy. November 2019, she had growth of tumor in the left upper lobe and chemo was restarted. She  "was intolerant  of CapOX but completed 6 infusions of FOLFOX. Of note, she had an oxaliplatin reaction and requires a prolonged infusion time.     She did have a bronchoscopy and thoracotomy on the left side in July 2020 after 15 rounds of chemotherapy.      2020 endoscopy showing only \"scar\" at site of prior tumor. No bx was taken. She is therefor PAT and i would strongly advocate for close attention to the primary tumor site as she is most at risk for local recurrence.      Of note, imaging for her is likely to be complicated as she has had significant reactions to CT dye even with the addition of premeds. She described her last reaction as just feeling \"tingly\"   She also has a history of a reaction to MRI dye though not premedicated. MRI event-- respiratory with throat symptoms.     Recall she is SHERIDAN, Kras mutated (NRAS unmutated) and noted to have RAD50 gene.  Discussed clonoseq type testing vs Signatera to evaluate for any occult evidence of circulating tumor. Will follow with current testing and with any abnormalities this will be done     MRI rectum in 2/2023 was negative.  Flex sig negative in 2/2023      Plan:   Rectal cancer:  PAT  -next Flex sig overdue.     -CT at least every 6 mo for now until at least 2025, next is due in 10/2024.  New nodules and ggo in b/l lower lobes.      - Negative Signatera 9/2023, repeat Q 3 months x 2 years.  - Continue port flushes at [facility] for port removal when pt agrees.        2. Mediport care:  Ongoing care. Some flow issue noted.      3. Premature menopausal symptoms:   She has a Mirena in place and now without regular menstrual cycles. Some concern about her breast cancer risk with RAD50 gene. Will be attentive to yearly screening. Has follow up yearly with GYN in [place]     4. obesity:  This is an ongoing issue and somewhat limits exam. May also put her at increased risk from cancer perspective.        5. Genetics  Rad50 mutation noted-- may predict risk " for Colon, Breast and ovarian.    Dagoberto Platt MD

## 2025-03-29 ENCOUNTER — LAB (OUTPATIENT)
Dept: LAB | Facility: HOSPITAL | Age: 30
End: 2025-03-29
Payer: COMMERCIAL

## 2025-03-29 DIAGNOSIS — C20 PRIMARY MALIGNANT NEOPLASM OF RECTUM (MULTI): ICD-10-CM

## 2025-03-29 DIAGNOSIS — C20 MALIGNANT NEOPLASM OF RECTUM (MULTI): Primary | ICD-10-CM

## 2025-04-03 ENCOUNTER — APPOINTMENT (OUTPATIENT)
Facility: CLINIC | Age: 30
End: 2025-04-03
Payer: COMMERCIAL

## 2025-04-03 VITALS
BODY MASS INDEX: 47.09 KG/M2 | SYSTOLIC BLOOD PRESSURE: 122 MMHG | WEIGHT: 293 LBS | HEIGHT: 66 IN | HEART RATE: 69 BPM | DIASTOLIC BLOOD PRESSURE: 82 MMHG

## 2025-04-03 DIAGNOSIS — C20 PRIMARY MALIGNANT NEOPLASM OF RECTUM (MULTI): ICD-10-CM

## 2025-04-03 PROCEDURE — 3008F BODY MASS INDEX DOCD: CPT | Performed by: NURSE PRACTITIONER

## 2025-04-03 PROCEDURE — 99214 OFFICE O/P EST MOD 30 MIN: CPT | Performed by: NURSE PRACTITIONER

## 2025-04-03 PROCEDURE — 1036F TOBACCO NON-USER: CPT | Performed by: NURSE PRACTITIONER

## 2025-04-03 RX ORDER — MINERAL OIL
180 ENEMA (ML) RECTAL DAILY
COMMUNITY

## 2025-04-03 ASSESSMENT — ENCOUNTER SYMPTOMS
WEAKNESS: 0
SORE THROAT: 0
ADENOPATHY: 0
PALPITATIONS: 0
SHORTNESS OF BREATH: 0
ARTHRALGIAS: 0
DIFFICULTY URINATING: 0
DIZZINESS: 0
WOUND: 0
CONFUSION: 0
TROUBLE SWALLOWING: 0
FEVER: 0
CHILLS: 0
BRUISES/BLEEDS EASILY: 0
ROS GI COMMENTS: SEE HPI
JOINT SWELLING: 0
COUGH: 0

## 2025-04-03 NOTE — PATIENT INSTRUCTIONS
Thank you for coming to your appointment today   - You will be scheduled for a colonoscopy in the OR   - Please follow the bowel prep instructions given to you by the office.   - After your procedure, you can expect to speak to the physician to go over the initial results of the procedure.   - If any polyps are removed during your procedure or if any biopsies are obtained those specimens will go to the pathologists to review under the microscope. Once those results are available they will be sent to the physician electronically to review. These results will also be available to you at that time through the patient portal. These results will be reviewed by the physician and communicated back to you with final recommendations. If you have questions or need additional information I urge you to call the office at [phone], but we do ask for patience as the we are often with patients.   - You were also given information regarding the schedule for your procedure including the time that you need to arrive to the endoscopy unit.  You will also be contacted about 1 week prior to your procedure to confirm the final arrival time.  If you have questions about this or if you need to cancel or change this appointment please call my office at [phone].      Please call [phone] with any questions or concerns

## 2025-04-03 NOTE — PROGRESS NOTES
Subjective   Patient ID: Elizabeth Wolff is a 29 y.o. female with PMH of stage IV rectal cancer w/mets to lungs s/p thoracotomy and left upper lobectomy (7/2020),RAD50 mutation, and obesity who was referred by Dagoberto Platt MD for Rectal Cancer.     Patient's PCP is No primary care provider on file.     HPI  Patient was last seen in March 2023. She has a history of stage IV rectal cancer in 2018. She now follows with oncology (Dr. Platt) and colorectal (Dr. Chaudhry). MRI 2/2023 showed no recurrence of tumor and flex-sig on 2/28/2023 showed a normal rectum. More recent CT Chest/Abd/Pelvis with contrast 1/24/25 showed no recurrence. She was having some alternating constipation/diarrhea that improved with metamucil. She was also having some GERD symptoms that improved with omeprazole 40mg daily.     She was referred back to us today for a surveillance colonoscopy. She is not currently having any rectal bleeding, rectal pain, change in bowel habits, abdominal pain, or unintended weight loss. She has lost about 10 lbs but has changed her diet.     She found green peppers and red sauce to trigger her heartburn; she has avoided these and now no longer needs any PPI or other heartburn meds.     She has continued increased fiber and has regular formed BMs most days.       Summary of endoscopies:  - flex-sig 6/2018: fungating non-obstructing medium mass in proximal rectum   - colonoscopy 7/2018: rectal mass in proximal colon; otherwise normal colon. Biopsies showed moderately differentiated invasive adenocarcinoma   - flex-sig 1/2019: normal colon; small change in vascular pattern of the rectum. Biopsies showed colonic mucosa; no malignancy   - flex-sig 5/2019: normal colon; scar in rectum   - flex-sig 10/2019: normal rectum   - lower EUS 4/2020: nonbleeding 10mm rectal scar; one 6mm benign lymph node in perirectal region; recommended repeat 1 year. Biopsies negative for dysplasia or carcinoma   - EGD 3/2021: normal  esophagus, stomach, and duodneum   - Colonoscopy 3/2021: rectal scar, no residual neoplastic tissue. Exam was otherwise normal.   - Flex-sig 3/2021: normal appearing rectum     Social Hx:  Tobacco: none   Etoh: none   Recreational drug use: none   NSAIDs: none       Family Hx:  Maternal grandma -- colon cancer   Maternal uncle -- colon cancer   Great aunt -- crohns   No pancreatitis     Review of Systems:  Review of Systems   Constitutional:  Negative for chills and fever.   HENT:  Negative for sore throat and trouble swallowing.    Respiratory:  Negative for cough and shortness of breath.    Cardiovascular:  Negative for chest pain and palpitations.   Gastrointestinal:         SEE HPI   Endocrine: Negative for cold intolerance and heat intolerance.   Genitourinary:  Negative for difficulty urinating.   Musculoskeletal:  Negative for arthralgias and joint swelling.   Skin:  Negative for rash and wound.   Neurological:  Negative for dizziness and weakness.   Hematological:  Negative for adenopathy. Does not bruise/bleed easily.   Psychiatric/Behavioral:  Negative for confusion.         Medications:  Prior to Admission medications    Medication Sig Start Date End Date Taking? Authorizing Provider   diphenhydrAMINE (BENADryl) 50 mg tablet Take 1 tablet (50 mg) by mouth 1 time for 1 dose. Take 1 tablet 1 hour prior to your imaging test with contrast. 12/19/24 12/19/24  Dagoberto Platt MD   famotidine (Pepcid) 20 mg tablet Take 1 tablet (20 mg) by mouth 1 time for 1 dose. 5/2/24 5/2/24  Jamila Perez, APRN-CNP   levonorgestrel (Mirena) 21 mcg/24 hours (8 yrs) 52 mg IUD by intrauterine route. Take as directed 12/18/19   Historical Provider, MD   predniSONE (Deltasone) 50 mg tablet Take 1 tablet (50 mg) by mouth 13 hours, 7 hours, and 1 hour prior to procedure. 12/19/24   Dagoberto Platt MD       Allergies:  Gadolinium-containing contrast media, Iodine, Oxaliplatin, Shellfish containing products, and Other    Past  Medical History:  She has no past medical history on file.    Past Surgical History:  She has a past surgical history that includes Other surgical history (04/27/2020); Other surgical history (08/17/2020); and Other surgical history (08/17/2020).    Social History:  She reports that she has never smoked. She has never been exposed to tobacco smoke. She has never used smokeless tobacco. She reports that she does not currently use alcohol. She reports that she does not use drugs.    Objective   Physical exam:  Physical Exam  Constitutional:       General: She is not in acute distress.     Appearance: Normal appearance.   HENT:      Mouth/Throat:      Mouth: Mucous membranes are moist.      Comments: pink  Eyes:      Conjunctiva/sclera: Conjunctivae normal.      Pupils: Pupils are equal, round, and reactive to light.   Cardiovascular:      Rate and Rhythm: Normal rate and regular rhythm.      Heart sounds: No murmur heard.  Pulmonary:      Effort: Pulmonary effort is normal.      Breath sounds: Normal breath sounds.   Abdominal:      General: Bowel sounds are normal. There is no distension.      Palpations: Abdomen is soft.      Tenderness: There is no abdominal tenderness. There is no guarding.   Skin:     General: Skin is warm and dry.      Coloration: Skin is not jaundiced.   Neurological:      Mental Status: She is alert and oriented to person, place, and time.   Psychiatric:         Mood and Affect: Mood normal.         Behavior: Behavior normal.          Assessment/Plan     History of rectal cancer, surveillance colonoscopy   Due for surveillance colonoscopy for history of rectal cancer in 2018. Will schedule in OR. Miralax prep.       No meds to hold for procedure          Kita Liriano, APRN-CNP

## 2025-04-04 ENCOUNTER — TELEPHONE (OUTPATIENT)
Dept: PREADMISSION TESTING | Facility: HOSPITAL | Age: 30
End: 2025-04-04
Payer: COMMERCIAL

## 2025-04-14 LAB
COMMENTS - MP RESULT TYPE: NORMAL
SCAN RESULT: NORMAL

## 2025-04-25 ENCOUNTER — PRE-ADMISSION TESTING (OUTPATIENT)
Dept: PREADMISSION TESTING | Facility: HOSPITAL | Age: 30
End: 2025-04-25
Payer: COMMERCIAL

## 2025-04-25 VITALS
SYSTOLIC BLOOD PRESSURE: 118 MMHG | RESPIRATION RATE: 20 BRPM | HEIGHT: 65 IN | WEIGHT: 293 LBS | DIASTOLIC BLOOD PRESSURE: 70 MMHG | BODY MASS INDEX: 48.82 KG/M2 | HEART RATE: 79 BPM | TEMPERATURE: 98.1 F | OXYGEN SATURATION: 99 %

## 2025-04-25 DIAGNOSIS — C78.00 RECTAL CANCER METASTASIZED TO LUNG (MULTI): ICD-10-CM

## 2025-04-25 DIAGNOSIS — Z01.818 PRE-OP EVALUATION: Primary | ICD-10-CM

## 2025-04-25 DIAGNOSIS — C20 RECTAL CANCER METASTASIZED TO LUNG (MULTI): ICD-10-CM

## 2025-04-25 LAB
ANION GAP SERPL CALC-SCNC: 10 MMOL/L (ref 10–20)
BUN SERPL-MCNC: 14 MG/DL (ref 6–23)
CALCIUM SERPL-MCNC: 9.1 MG/DL (ref 8.6–10.3)
CHLORIDE SERPL-SCNC: 106 MMOL/L (ref 98–107)
CO2 SERPL-SCNC: 26 MMOL/L (ref 21–32)
CREAT SERPL-MCNC: 0.57 MG/DL (ref 0.5–1.05)
EGFRCR SERPLBLD CKD-EPI 2021: >90 ML/MIN/1.73M*2
ERYTHROCYTE [DISTWIDTH] IN BLOOD BY AUTOMATED COUNT: 13.7 % (ref 11.5–14.5)
GLUCOSE SERPL-MCNC: 86 MG/DL (ref 74–99)
HCT VFR BLD AUTO: 40.6 % (ref 36–46)
HGB BLD-MCNC: 13.2 G/DL (ref 12–16)
MCH RBC QN AUTO: 25.9 PG (ref 26–34)
MCHC RBC AUTO-ENTMCNC: 32.5 G/DL (ref 32–36)
MCV RBC AUTO: 80 FL (ref 80–100)
NRBC BLD-RTO: 0 /100 WBCS (ref 0–0)
PLATELET # BLD AUTO: 259 X10*3/UL (ref 150–450)
POTASSIUM SERPL-SCNC: 3.9 MMOL/L (ref 3.5–5.3)
RBC # BLD AUTO: 5.1 X10*6/UL (ref 4–5.2)
SODIUM SERPL-SCNC: 138 MMOL/L (ref 136–145)
WBC # BLD AUTO: 9.9 X10*3/UL (ref 4.4–11.3)

## 2025-04-25 PROCEDURE — 85027 COMPLETE CBC AUTOMATED: CPT

## 2025-04-25 PROCEDURE — 80048 BASIC METABOLIC PNL TOTAL CA: CPT

## 2025-04-25 PROCEDURE — 99203 OFFICE O/P NEW LOW 30 MIN: CPT | Performed by: NURSE PRACTITIONER

## 2025-04-25 PROCEDURE — 93010 ELECTROCARDIOGRAM REPORT: CPT | Performed by: INTERNAL MEDICINE

## 2025-04-25 RX ORDER — BISMUTH SUBSALICYLATE 262 MG
1 TABLET,CHEWABLE ORAL DAILY
COMMUNITY

## 2025-04-25 ASSESSMENT — DUKE ACTIVITY SCORE INDEX (DASI)
CAN YOU WALK A BLOCK OR TWO ON LEVEL GROUND: YES
CAN YOU RUN A SHORT DISTANCE: NO
CAN YOU DO YARD WORK LIKE RAKING LEAVES, WEEDING OR PUSHING A MOWER: YES
CAN YOU WALK INDOORS, SUCH AS AROUND YOUR HOUSE: YES
CAN YOU DO MODERATE WORK AROUND THE HOUSE LIKE VACUUMING, SWEEPING FLOORS OR CARRYING GROCERIES: YES
CAN YOU TAKE CARE OF YOURSELF (EAT, DRESS, BATHE, OR USE TOILET): YES
CAN YOU DO HEAVY WORK AROUND THE HOUSE LIKE SCRUBBING FLOORS OR LIFTING AND MOVING HEAVY FURNITURE: YES
CAN YOU PARTICIPATE IN STRENOUS SPORTS LIKE SWIMMING, SINGLES TENNIS, FOOTBALL, BASKETBALL, OR SKIING: NO
CAN YOU CLIMB A FLIGHT OF STAIRS OR WALK UP A HILL: YES
CAN YOU DO LIGHT WORK AROUND THE HOUSE LIKE DUSTING OR WASHING DISHES: YES

## 2025-04-25 ASSESSMENT — ACTIVITIES OF DAILY LIVING (ADL): ADL_SCORE: 0

## 2025-04-25 ASSESSMENT — LIFESTYLE VARIABLES: SMOKING_STATUS: NONSMOKER

## 2025-04-25 NOTE — CPM/PAT H&P
"CPM/PAT Evaluation       Name: Elizabeth Wolff (Elizabeth Wolff)  /Age: 1995/29 y.o.     In-Person       Chief Complaint: pre-operative RN visit for scheduled colonoscopy with Dr. Holloway on 25.     HPI    Medical History[1]    Surgical History[2]    Patient  reports that she is not currently sexually active.    Family History[3]    Allergies[4]    Prior to Admission medications    Medication Sig Start Date End Date Taking? Authorizing Provider   diphenhydrAMINE (BENADryl) 50 mg tablet Take 1 tablet (50 mg) by mouth 1 time for 1 dose. Take 1 tablet 1 hour prior to your imaging test with contrast.  Patient not taking: Reported on 4/3/2025 12/19/24 12/19/24  Dagoberto Platt MD   fexofenadine (Allegra) 180 mg tablet Take 1 tablet (180 mg) by mouth once daily.    Historical Provider, MD   levonorgestrel (Mirena) 21 mcg/24 hours (8 yrs) 52 mg IUD by intrauterine route. Take as directed 19   Historical Provider, MD   multivitamin tablet Take 1 tablet by mouth once daily.    Historical Provider, MD   predniSONE (Deltasone) 50 mg tablet Take 1 tablet (50 mg) by mouth 13 hours, 7 hours, and 1 hour prior to procedure.  Patient not taking: Reported on 4/3/2025 12/19/24   Dagoberto Platt MD        PAT ROS     PAT Physical Exam     Airway    Testing/Diagnostic:     Patient Specialist/PCP:     Visit Vitals  /70   Pulse 79   Temp 36.7 °C (98.1 °F) (Temporal)   Resp 20   Ht 1.651 m (5' 5\")   Wt 139 kg (306 lb 11.2 oz)   SpO2 99%   BMI 51.04 kg/m²   OB Status Implant   Smoking Status Never   BSA 2.52 m²       DASI Risk Score      Flowsheet Row Pre-Admission Testing from 2025 in  [facility]   Can you take care of yourself (eat, dress, bathe, or use toilet)?  2.75 filed at 2025 1335   Can you walk indoors, such as around your house? 1.75 filed at 2025 1335   Can you walk a block or two on level ground?  2.75 filed at 2025 6037   Can you climb a flight of stairs or " walk up a hill? 5.5 filed at 04/25/2025 1335   Can you run a short distance? 0 filed at 04/25/2025 1335   Can you do light work around the house like dusting or washing dishes? 2.7 filed at 04/25/2025 1335   Can you do moderate work around the house like vacuuming, sweeping floors or carrying groceries? 3.5 filed at 04/25/2025 1335   Can you do heavy work around the house like scrubbing floors or lifting and moving heavy furniture?  8 filed at 04/25/2025 1335   Can you do yard work like raking leaves, weeding or pushing a mower? 4.5 filed at 04/25/2025 1335   Can you participate in strenous sports like swimming, singles tennis, football, basketball, or skiing? 0 filed at 04/25/2025 1335          Caprini DVT Assessment      Flowsheet Row Pre-Admission Testing from 4/25/2025 in  [facility]   DVT Score (IF A SCORE IS NOT CALCULATING, MUST SELECT A BMI TO COMPLETE) 6 filed at 04/25/2025 1645   Medical Factors Present cancer, chemotherapy, or previous malignancy filed at 04/25/2025 1645   Surgical Factors Minor surgery planned filed at 04/25/2025 1645   BMI (BMI MUST BE CHOSEN) Greater than 50 (Venous stasis syndrome) filed at 04/25/2025 1645          Modified Frailty Index      Flowsheet Row Pre-Admission Testing from 4/25/2025 in  [facility]   Non-independent functional status (problems with dressing, bathing, personal grooming, or cooking) 0 filed at 04/25/2025 1648   History of diabetes mellitus  0 filed at 04/25/2025 1648   History of COPD 0 filed at 04/25/2025 1648   History of CHF No filed at 04/25/2025 1648   History of MI 0 filed at 04/25/2025 1648   History of Percutaneous Coronary Intervention, Cardiac Surgery, or Angina No filed at 04/25/2025 1648   Hypertension requiring the use of medication  0 filed at 04/25/2025 1648   Peripheral vascular disease 0 filed at 04/25/2025 1648   Impaired sensorium (cognitive impairement or loss, clouding, or delirium) 0 filed at 04/25/2025 8123    TIA or CVA withouy residual deficit 0 filed at 04/25/2025 1648   Cerebrovascular accident with deficit 0 filed at 04/25/2025 1648   Modified Frailty Index Calculator 0 filed at 04/25/2025 1648          [id number] Stroke Risk Points  Current as of 10 minutes ago        N/A 0 to 9 Points:      Last Change: N/A          The [id number] risk score (Lip PATRICIA, et al. 2009. © 2010 American College [employer]) quantifies the risk of stroke for a patient with atrial fibrillation. For patients without atrial fibrillation or under the age of 18 this score appears as N/A. Higher score values generally indicate higher risk of stroke.        This score is not applicable to this patient. Components are not calculated.          Revised Cardiac Risk Index      Flowsheet Row Pre-Admission Testing from 4/25/2025 in  [facility]   High-Risk Surgery (Intraperitoneal, Intrathoracic,Suprainguinal vascular) 0 filed at 04/25/2025 1647   History of ischemic heart disease (History of MI, History of positive exercuse test, Current chest paint considered due to myocardial ischemia, Use of nitrate therapy, ECG with pathological Q Waves) 0 filed at 04/25/2025 1647   History of congestive heart failure (pulmonary edemia, bilateral rales or S3 gallop, Paroxysmal nocturnal dyspnea, CXR showing pulmonary vascular redistribution) 0 filed at 04/25/2025 1647   History of cerebrovascular disease (Prior TIA or stroke) 0 filed at 04/25/2025 1647   Pre-operative insulin treatment 0 filed at 04/25/2025 1647   Pre-operative creatinine>2 mg/dl 0 filed at 04/25/2025 1647   Revised Cardiac Risk Calculator 0 filed at 04/25/2025 1647          Apfel Simplified Score      Flowsheet Row Pre-Admission Testing from 4/25/2025 in  [facility]   Smoking status 1 filed at 04/25/2025 1647   History of motion sickness or PONV  0 filed at 04/25/2025 1647   Use of postoperative opioids 0 filed at 04/25/2025 1647   Gender - Female 1=Yes  filed at 2025 1647   Apfel Simplified Score Calculator 2 filed at 2025 1647          Risk Analysis Index Results This Encounter         2025  1648             Do you live in a place other than your own home?: 0    Any kidney failure, kidney not working well, or seeing a kidney doctor (nephrologist)? If yes, was this for kidney stones or another problem?: 0 No    Any history of chronic (long-term) congestive heart failure (CHF)?: 0 No    Any shortness of breath when resting?: 0 No    In the past five years, have you been diagnosed with or treated for cancer?: Yes    During the last 3 months has it become difficult for you to remember things or organize your thoughts?: 0 No    Have you lost weight of 10 pounds or more in the past 3 months without trying?: 0 No    Do you have any loss of appetitie?: 0 No    Getting Around (Mobility): 0 Can get around without help    Eatin Can plan and prepare own meals    Toiletin Can use toilet without any help    Personal Hygiene (Bathing, Hand Washing, Changing Clothes): 0 Can shower or bathe without any help    SWANSON Cancer History: Patient indicates history of cancer    Total Risk Analysis Index Score Without Cancer: 6    Total Risk Analysis Index Score: 30          Stop Bang Score      Flowsheet Row Pre-Admission Testing from 2025 in  [facility]   Do you snore loudly? 0 filed at 2025 1335   Do you often feel tired or fatigued after your sleep? 0 filed at 2025 1335   Has anyone ever observed you stop breathing in your sleep? 0 filed at 2025 1335   Do you have or are you being treated for high blood pressure? 0 filed at 2025 1335   Recent BMI (Calculated) 51 filed at 2025 1335   Is BMI greater than 35 kg/m2? 1=Yes filed at 2025 1335   Age older than 50 years old? 0=No filed at 2025 1335   Is your neck circumference greater than 17 inches (Male) or 16 inches (Female)? 1 filed at 2025 1335    Gender - Male 0=No filed at 04/25/2025 1335   STOP-BANG Total Score 2 filed at 04/25/2025 1335          Prodigy: High Risk  Total Score: 0          ARISCAT Score for Postoperative Pulmonary Complications      Flowsheet Row Pre-Admission Testing from 4/25/2025 in  [facility]   Age Calculated Score 0 filed at 04/25/2025 1648   Preoperative SpO2 0 filed at 04/25/2025 1648   Respiratory infection in the last month Either upper or lower (i.e., URI, bronchitis, pneumonia), with fever and antibiotic treatment 0 filed at 04/25/2025 1648   Preoperative anemia (Hgb less than 10 g/dl) 0 filed at 04/25/2025 1648   Surgical incision  0 filed at 04/25/2025 1648   Duration of surgery  0 filed at 04/25/2025 1648   Emergency Procedure  0 filed at 04/25/2025 1648   ARISCAT Total Score  0 filed at 04/25/2025 1648          Edmondson Perioperative Risk for Myocardial Infarction or Cardiac Arrest (JAYANT)      Flowsheet Row Pre-Admission Testing from 4/25/2025 in  [facility]   Calculated Age Score 0.62 filed at 04/25/2025 1649   Functional Status  0 filed at 04/25/2025 1649   ASA Class  -3.29 filed at 04/25/2025 1649   Creatinine 0 filed at 04/25/2025 1649   Type of Procedure  1.14 filed at 04/25/2025 1649   JAYANT Total Score  -6.78 filed at 04/25/2025 1649   JAYANT % 0.11 filed at 04/25/2025 1649          1. Pre-op evaluation  ECG 12 lead (Clinic Performed)    Basic metabolic panel    CBC    CBC    Basic metabolic panel      2. Rectal cancer metastasized to lung (Multi)  ECG 12 lead (Clinic Performed)    Basic metabolic panel    CBC    CBC    Basic metabolic panel         Pre-Admission Testing on 04/25/2025   Component Date Value Ref Range Status    WBC 04/25/2025 9.9  4.4 - 11.3 x10*3/uL Final    nRBC 04/25/2025 0.0  0.0 - 0.0 /100 WBCs Final    RBC 04/25/2025 5.10  4.00 - 5.20 x10*6/uL Final    Hemoglobin 04/25/2025 13.2  12.0 - 16.0 g/dL Final    Hematocrit 04/25/2025 40.6  36.0 - 46.0 % Final    MCV 04/25/2025  80  80 - 100 fL Final    MCH 04/25/2025 25.9 (L)  26.0 - 34.0 pg Final    MCHC 04/25/2025 32.5  32.0 - 36.0 g/dL Final    RDW 04/25/2025 13.7  11.5 - 14.5 % Final    Platelets 04/25/2025 259  150 - 450 x10*3/uL Final    Glucose 04/25/2025 86  74 - 99 mg/dL Final    Sodium 04/25/2025 138  136 - 145 mmol/L Final    Potassium 04/25/2025 3.9  3.5 - 5.3 mmol/L Final    Chloride 04/25/2025 106  98 - 107 mmol/L Final    Bicarbonate 04/25/2025 26  21 - 32 mmol/L Final    Anion Gap 04/25/2025 10  10 - 20 mmol/L Final    Urea Nitrogen 04/25/2025 14  6 - 23 mg/dL Final    Creatinine 04/25/2025 0.57  0.50 - 1.05 mg/dL Final    eGFR 04/25/2025 >90  >60 mL/min/1.73m*2 Final    Calculations of estimated GFR are performed using the 2021 CKD-EPI Study Refit equation without the race variable for the IDMS-Traceable creatinine methods.  https://jasn.asnjournals.org/content/early/2021/09/22/ASN.3725870659    Calcium 04/25/2025 9.1  8.6 - 10.3 mg/dL Final   Lab on 03/29/2025   Component Date Value Ref Range Status    Scan Result 03/28/2025 See Scanned Result   Final    Comments 03/28/2025    Final                    Value:Report has been scanned to the patient's chart.       Assessment and Plan:     Gastrointestinal: pre-operative RN visit for scheduled colonoscopy with Dr. Holloway on 5/2/25.   EKG today shows SR with rate of 70.  Previous EKG on 7/21/20 also showed SR.  BMP today WNL.  CBC today WNL except MCH 25.9 L.  The RN discussed the dosing of her medications.  All surgery instructions reviewed with patient by RN. Verbalized understanding.    Encouraged early ambulation, DVT/ PE prevention, constipation prevention, and C&DB post operatively. Patient verbalized understanding.    Reviewed Oncology note with Dr. Platt on 3/20/25 and GI note with HAIDER Liriano on 4/3/25.    Maxine Hopkins, APRN-CNP              [1]   Past Medical History:  Diagnosis Date    Colorectal cancer (Multi)    [2]   Past Surgical History:  Procedure  Laterality Date    OTHER SURGICAL HISTORY  04/27/2020    Sigmoidoscopy    OTHER SURGICAL HISTORY  08/17/2020    Lung lobectomy    OTHER SURGICAL HISTORY  08/17/2020    Thoracotomy    WISDOM TOOTH EXTRACTION     [3]   Family History  Problem Relation Name Age of Onset    Colon cancer Mother's Brother      Colon cancer Maternal Grandmother      Skin cancer Maternal Grandfather      Breast cancer Other Maternal Relatives     Colon cancer Other Maternal Relatives     Pancreatic cancer Other Maternal Relatives    [4]   Allergies  Allergen Reactions    Gadolinium-Containing Contrast Media Swelling     Throat swelling    Iodine Swelling     Premedicated for exam in past with no issues    Oxaliplatin Swelling     Facial swelling    Shellfish Containing Products Swelling     Throat swelling    Other Swelling

## 2025-04-25 NOTE — PREPROCEDURE INSTRUCTIONS
Medication List            Accurate as of April 25, 2025  2:04 PM. Always use your most recent med list.                diphenhydrAMINE 50 mg tablet  Commonly known as: BENADryl  Take 1 tablet (50 mg) by mouth 1 time for 1 dose. Take 1 tablet 1 hour prior to your imaging test with contrast.     fexofenadine 180 mg tablet  Commonly known as: Allegra     Mirena 20 mcg/24hr IUD  Generic drug: levonorgestrel     multivitamin tablet     predniSONE 50 mg tablet  Commonly known as: Deltasone  Take 1 tablet (50 mg) by mouth 13 hours, 7 hours, and 1 hour prior to procedure.                              NPO Instructions:    Do not eat any food after midnight the night before your surgery/procedure.    Additional Instructions:     Wear  comfortable loose fitting clothing  Do not use moisturizers, creams, lotions or perfume  All jewelry and valuables should be left at home    Must have a   Stop multi vitamin as of today

## 2025-04-28 ENCOUNTER — HOSPITAL ENCOUNTER (OUTPATIENT)
Dept: CARDIOLOGY | Facility: HOSPITAL | Age: 30
Discharge: HOME | End: 2025-04-28
Payer: COMMERCIAL

## 2025-04-28 LAB
ATRIAL RATE: 69 BPM
P AXIS: 10 DEGREES
PR INTERVAL: 157 MS
Q ONSET: 253 MS
QRS COUNT: 11 BEATS
QRS DURATION: 98 MS
QT INTERVAL: 380 MS
QTC CALCULATION(BAZETT): 410 MS
QTC FREDERICIA: 400 MS
R AXIS: 62 DEGREES
T AXIS: 19 DEGREES
T OFFSET: 443 MS
VENTRICULAR RATE: 70 BPM

## 2025-04-28 PROCEDURE — 93005 ELECTROCARDIOGRAM TRACING: CPT

## 2025-04-30 ENCOUNTER — ANESTHESIA EVENT (OUTPATIENT)
Dept: OPERATING ROOM | Facility: HOSPITAL | Age: 30
End: 2025-04-30
Payer: COMMERCIAL

## 2025-04-30 RX ORDER — OXYCODONE HYDROCHLORIDE 5 MG/1
5 TABLET ORAL EVERY 4 HOURS PRN
Refills: 0 | Status: CANCELLED | OUTPATIENT
Start: 2025-04-30

## 2025-04-30 RX ORDER — LIDOCAINE HYDROCHLORIDE 10 MG/ML
0.1 INJECTION, SOLUTION EPIDURAL; INFILTRATION; INTRACAUDAL; PERINEURAL ONCE
Status: CANCELLED | OUTPATIENT
Start: 2025-04-30 | End: 2025-04-30

## 2025-04-30 RX ORDER — MORPHINE SULFATE 2 MG/ML
1 INJECTION, SOLUTION INTRAMUSCULAR; INTRAVENOUS EVERY 5 MIN PRN
Status: CANCELLED | OUTPATIENT
Start: 2025-04-30

## 2025-04-30 RX ORDER — ONDANSETRON HYDROCHLORIDE 2 MG/ML
4 INJECTION, SOLUTION INTRAVENOUS ONCE AS NEEDED
Status: CANCELLED | OUTPATIENT
Start: 2025-04-30

## 2025-04-30 RX ORDER — SODIUM CHLORIDE, SODIUM LACTATE, POTASSIUM CHLORIDE, CALCIUM CHLORIDE 600; 310; 30; 20 MG/100ML; MG/100ML; MG/100ML; MG/100ML
100 INJECTION, SOLUTION INTRAVENOUS CONTINUOUS
Status: CANCELLED | OUTPATIENT
Start: 2025-04-30 | End: 2025-04-30

## 2025-05-02 ENCOUNTER — ANESTHESIA (OUTPATIENT)
Dept: OPERATING ROOM | Facility: HOSPITAL | Age: 30
End: 2025-05-02
Payer: COMMERCIAL

## 2025-05-02 ENCOUNTER — HOSPITAL ENCOUNTER (OUTPATIENT)
Dept: OPERATING ROOM | Facility: HOSPITAL | Age: 30
Discharge: HOME | End: 2025-05-02
Payer: COMMERCIAL

## 2025-05-02 VITALS
HEART RATE: 74 BPM | BODY MASS INDEX: 47.09 KG/M2 | OXYGEN SATURATION: 98 % | HEIGHT: 66 IN | SYSTOLIC BLOOD PRESSURE: 118 MMHG | DIASTOLIC BLOOD PRESSURE: 70 MMHG | RESPIRATION RATE: 18 BRPM | WEIGHT: 293 LBS | TEMPERATURE: 97.7 F

## 2025-05-02 DIAGNOSIS — C20 PRIMARY MALIGNANT NEOPLASM OF RECTUM (MULTI): ICD-10-CM

## 2025-05-02 PROCEDURE — 2500000004 HC RX 250 GENERAL PHARMACY W/ HCPCS (ALT 636 FOR OP/ED): Performed by: NURSE ANESTHETIST, CERTIFIED REGISTERED

## 2025-05-02 PROCEDURE — 7100000009 HC PHASE TWO TIME - INITIAL BASE CHARGE: Performed by: NURSE ANESTHETIST, CERTIFIED REGISTERED

## 2025-05-02 PROCEDURE — 3700000001 HC GENERAL ANESTHESIA TIME - INITIAL BASE CHARGE: Performed by: NURSE ANESTHETIST, CERTIFIED REGISTERED

## 2025-05-02 PROCEDURE — 3700000002 HC GENERAL ANESTHESIA TIME - EACH INCREMENTAL 1 MINUTE: Performed by: NURSE ANESTHETIST, CERTIFIED REGISTERED

## 2025-05-02 PROCEDURE — 7100000010 HC PHASE TWO TIME - EACH INCREMENTAL 1 MINUTE: Performed by: NURSE ANESTHETIST, CERTIFIED REGISTERED

## 2025-05-02 PROCEDURE — 3600000002 HC OR TIME - INITIAL BASE CHARGE - PROCEDURE LEVEL TWO: Performed by: NURSE ANESTHETIST, CERTIFIED REGISTERED

## 2025-05-02 PROCEDURE — 2500000004 HC RX 250 GENERAL PHARMACY W/ HCPCS (ALT 636 FOR OP/ED): Mod: JZ | Performed by: ANESTHESIOLOGY

## 2025-05-02 PROCEDURE — 45378 DIAGNOSTIC COLONOSCOPY: CPT | Performed by: INTERNAL MEDICINE

## 2025-05-02 PROCEDURE — 3600000007 HC OR TIME - EACH INCREMENTAL 1 MINUTE - PROCEDURE LEVEL TWO: Performed by: NURSE ANESTHETIST, CERTIFIED REGISTERED

## 2025-05-02 RX ORDER — PROPOFOL 10 MG/ML
INJECTION, EMULSION INTRAVENOUS AS NEEDED
Status: DISCONTINUED | OUTPATIENT
Start: 2025-05-02 | End: 2025-05-02

## 2025-05-02 RX ORDER — FAMOTIDINE 10 MG/ML
20 INJECTION, SOLUTION INTRAVENOUS ONCE
Status: COMPLETED | OUTPATIENT
Start: 2025-05-02 | End: 2025-05-02

## 2025-05-02 RX ORDER — SODIUM CHLORIDE, SODIUM LACTATE, POTASSIUM CHLORIDE, CALCIUM CHLORIDE 600; 310; 30; 20 MG/100ML; MG/100ML; MG/100ML; MG/100ML
75 INJECTION, SOLUTION INTRAVENOUS CONTINUOUS
Status: ACTIVE | OUTPATIENT
Start: 2025-05-02 | End: 2025-05-02

## 2025-05-02 RX ORDER — LIDOCAINE HCL/PF 100 MG/5ML
SYRINGE (ML) INTRAVENOUS AS NEEDED
Status: DISCONTINUED | OUTPATIENT
Start: 2025-05-02 | End: 2025-05-02

## 2025-05-02 RX ADMIN — PROPOFOL 50 MG: 10 INJECTION, EMULSION INTRAVENOUS at 07:38

## 2025-05-02 RX ADMIN — SODIUM CHLORIDE, POTASSIUM CHLORIDE, SODIUM LACTATE AND CALCIUM CHLORIDE: 600; 310; 30; 20 INJECTION, SOLUTION INTRAVENOUS at 07:30

## 2025-05-02 RX ADMIN — LIDOCAINE HYDROCHLORIDE 100 MG: 20 INJECTION INTRAVENOUS at 07:32

## 2025-05-02 RX ADMIN — FAMOTIDINE 20 MG: 10 INJECTION, SOLUTION INTRAVENOUS at 06:45

## 2025-05-02 RX ADMIN — PROPOFOL 100 MG: 10 INJECTION, EMULSION INTRAVENOUS at 07:40

## 2025-05-02 RX ADMIN — PROPOFOL 50 MG: 10 INJECTION, EMULSION INTRAVENOUS at 07:39

## 2025-05-02 RX ADMIN — PROPOFOL 50 MG: 10 INJECTION, EMULSION INTRAVENOUS at 07:42

## 2025-05-02 RX ADMIN — PROPOFOL 100 MG: 10 INJECTION, EMULSION INTRAVENOUS at 07:35

## 2025-05-02 RX ADMIN — PROPOFOL 100 MG: 10 INJECTION, EMULSION INTRAVENOUS at 07:32

## 2025-05-02 SDOH — HEALTH STABILITY: MENTAL HEALTH: CURRENT SMOKER: 0

## 2025-05-02 ASSESSMENT — PAIN SCALES - GENERAL
PAIN_LEVEL: 0
PAINLEVEL_OUTOF10: 0 - NO PAIN

## 2025-05-02 ASSESSMENT — COLUMBIA-SUICIDE SEVERITY RATING SCALE - C-SSRS
1. IN THE PAST MONTH, HAVE YOU WISHED YOU WERE DEAD OR WISHED YOU COULD GO TO SLEEP AND NOT WAKE UP?: NO
6. HAVE YOU EVER DONE ANYTHING, STARTED TO DO ANYTHING, OR PREPARED TO DO ANYTHING TO END YOUR LIFE?: NO
2. HAVE YOU ACTUALLY HAD ANY THOUGHTS OF KILLING YOURSELF?: NO

## 2025-05-02 ASSESSMENT — PAIN - FUNCTIONAL ASSESSMENT: PAIN_FUNCTIONAL_ASSESSMENT: 0-10

## 2025-05-02 NOTE — ANESTHESIA POSTPROCEDURE EVALUATION
Patient: Elizabeth Wolff    Procedure Summary       Date: 05/02/25 Room / Location: [facility] OR    Anesthesia Start: 0730 Anesthesia Stop: 0752    Procedure: COLONOSCOPY Diagnosis: Primary malignant neoplasm of rectum (Multi)    Scheduled Providers: Mekhi Holloway DO Responsible Provider: BALDOMERO Vásquez    Anesthesia Type: MAC ASA Status: 3            Anesthesia Type: MAC    Vitals Value Taken Time   /70 05/02/25 08:10   Temp 36.5 °C (97.7 °F) 05/02/25 08:18   Pulse 80 05/02/25 08:10   Resp 43 05/02/25 08:11   SpO2 95 % 05/02/25 08:11   Vitals shown include unfiled device data.    Anesthesia Post Evaluation    Patient location during evaluation: PACU  Patient participation: complete - patient participated  Level of consciousness: awake  Pain score: 0  Pain management: adequate  Airway patency: patent  Cardiovascular status: acceptable  Respiratory status: acceptable  Hydration status: acceptable  Postoperative Nausea and Vomiting: none        No notable events documented.

## 2025-05-02 NOTE — ANESTHESIA PREPROCEDURE EVALUATION
Patient: Elizabeth Wolff    Procedure Information       Date/Time: 05/02/25 0730    Scheduled providers: Mekhi Holloway DO    Procedure: COLONOSCOPY    Location: [facility] OR            Relevant Problems   Anesthesia (within normal limits)      Cardiac   (+) Chest wall pain      Pulmonary   (+) Malignant neoplasm metastatic to left lung (Multi)   (+) Rectal cancer metastasized to lung (Multi)      GI   (+) Acid reflux   (+) Primary malignant neoplasm of rectum (Multi)   (+) Rectal bleeding      Liver   (+) Primary malignant neoplasm of rectum (Multi)      Endocrine   (+) Morbid obesity (Multi)      ID   (+) Viral illness       Clinical information reviewed:   Tobacco  Allergies  Meds   Med Hx  Surg Hx  OB Status  Fam Hx  Soc   Hx        NPO Detail:  NPO/Void Status  Carbohydrate Drink Given Prior to Surgery? : N  Date of Last Liquid: 05/02/25  Time of Last Liquid: 0200  Date of Last Solid: 04/30/25  Time of Last Solid: 2350  Last Intake Type: Clear fluids  Time of Last Void: 0611         Physical Exam    Airway  Mallampati: II  TM distance: >3 FB  Neck ROM: full  Mouth opening: 3 or more finger widths     Cardiovascular - normal exam   Dental - normal exam     Pulmonary - normal exam   Abdominal - normal exam           Anesthesia Plan    History of general anesthesia?: yes  History of complications of general anesthesia?: no    ASA 3     MAC     The patient is not a current smoker.    intravenous induction   Anesthetic plan and risks discussed with patient.    Plan discussed with CRNA.

## 2025-06-17 ENCOUNTER — HOSPITAL ENCOUNTER (OUTPATIENT)
Dept: RADIOLOGY | Facility: HOSPITAL | Age: 30
Discharge: HOME | End: 2025-06-17
Payer: COMMERCIAL

## 2025-06-17 DIAGNOSIS — C20 PRIMARY MALIGNANT NEOPLASM OF RECTUM (MULTI): ICD-10-CM

## 2025-06-17 PROCEDURE — 71250 CT THORAX DX C-: CPT | Performed by: RADIOLOGY

## 2025-06-17 PROCEDURE — 71250 CT THORAX DX C-: CPT

## 2025-06-19 ENCOUNTER — LAB (OUTPATIENT)
Dept: HEMATOLOGY/ONCOLOGY | Facility: HOSPITAL | Age: 30
End: 2025-06-19
Payer: COMMERCIAL

## 2025-06-19 ENCOUNTER — OFFICE VISIT (OUTPATIENT)
Dept: HEMATOLOGY/ONCOLOGY | Facility: HOSPITAL | Age: 30
End: 2025-06-19
Payer: COMMERCIAL

## 2025-06-19 VITALS
TEMPERATURE: 96.8 F | RESPIRATION RATE: 20 BRPM | OXYGEN SATURATION: 100 % | HEART RATE: 81 BPM | WEIGHT: 293 LBS | SYSTOLIC BLOOD PRESSURE: 136 MMHG | BODY MASS INDEX: 49.35 KG/M2 | DIASTOLIC BLOOD PRESSURE: 82 MMHG

## 2025-06-19 DIAGNOSIS — Z00.00 ROUTINE GENERAL MEDICAL EXAMINATION AT A HEALTH CARE FACILITY: Primary | ICD-10-CM

## 2025-06-19 DIAGNOSIS — C20 PRIMARY MALIGNANT NEOPLASM OF RECTUM (MULTI): ICD-10-CM

## 2025-06-19 LAB
ALBUMIN SERPL BCP-MCNC: 3.9 G/DL (ref 3.4–5)
ALP SERPL-CCNC: 81 U/L (ref 33–110)
ALT SERPL W P-5'-P-CCNC: 9 U/L (ref 7–45)
ANION GAP SERPL CALC-SCNC: 12 MMOL/L (ref 10–20)
AST SERPL W P-5'-P-CCNC: 10 U/L (ref 9–39)
BASOPHILS # BLD AUTO: 0.05 X10*3/UL (ref 0–0.1)
BASOPHILS NFR BLD AUTO: 0.6 %
BILIRUB SERPL-MCNC: 0.5 MG/DL (ref 0–1.2)
BUN SERPL-MCNC: 13 MG/DL (ref 6–23)
CALCIUM SERPL-MCNC: 9.1 MG/DL (ref 8.6–10.3)
CEA SERPL-MCNC: <0.5 UG/L
CHLORIDE SERPL-SCNC: 107 MMOL/L (ref 98–107)
CO2 SERPL-SCNC: 25 MMOL/L (ref 21–32)
CREAT SERPL-MCNC: 0.58 MG/DL (ref 0.5–1.05)
EGFRCR SERPLBLD CKD-EPI 2021: >90 ML/MIN/1.73M*2
EOSINOPHIL # BLD AUTO: 0.06 X10*3/UL (ref 0–0.7)
EOSINOPHIL NFR BLD AUTO: 0.7 %
ERYTHROCYTE [DISTWIDTH] IN BLOOD BY AUTOMATED COUNT: 13.8 % (ref 11.5–14.5)
GLUCOSE SERPL-MCNC: 97 MG/DL (ref 74–99)
HCT VFR BLD AUTO: 39.2 % (ref 36–46)
HGB BLD-MCNC: 12.6 G/DL (ref 12–16)
IMM GRANULOCYTES # BLD AUTO: 0.04 X10*3/UL (ref 0–0.7)
IMM GRANULOCYTES NFR BLD AUTO: 0.4 % (ref 0–0.9)
LYMPHOCYTES # BLD AUTO: 2.08 X10*3/UL (ref 1.2–4.8)
LYMPHOCYTES NFR BLD AUTO: 23.4 %
MCH RBC QN AUTO: 26.2 PG (ref 26–34)
MCHC RBC AUTO-ENTMCNC: 32.1 G/DL (ref 32–36)
MCV RBC AUTO: 82 FL (ref 80–100)
MONOCYTES # BLD AUTO: 0.46 X10*3/UL (ref 0.1–1)
MONOCYTES NFR BLD AUTO: 5.2 %
NEUTROPHILS # BLD AUTO: 6.2 X10*3/UL (ref 1.2–7.7)
NEUTROPHILS NFR BLD AUTO: 69.7 %
NRBC BLD-RTO: 0 /100 WBCS (ref 0–0)
PLATELET # BLD AUTO: 248 X10*3/UL (ref 150–450)
POTASSIUM SERPL-SCNC: 3.8 MMOL/L (ref 3.5–5.3)
PROT SERPL-MCNC: 6.8 G/DL (ref 6.4–8.2)
RBC # BLD AUTO: 4.81 X10*6/UL (ref 4–5.2)
SODIUM SERPL-SCNC: 140 MMOL/L (ref 136–145)
WBC # BLD AUTO: 8.9 X10*3/UL (ref 4.4–11.3)

## 2025-06-19 PROCEDURE — 85025 COMPLETE CBC W/AUTO DIFF WBC: CPT

## 2025-06-19 PROCEDURE — 84075 ASSAY ALKALINE PHOSPHATASE: CPT

## 2025-06-19 PROCEDURE — 2500000004 HC RX 250 GENERAL PHARMACY W/ HCPCS (ALT 636 FOR OP/ED): Performed by: NURSE PRACTITIONER

## 2025-06-19 PROCEDURE — 99214 OFFICE O/P EST MOD 30 MIN: CPT | Performed by: INTERNAL MEDICINE

## 2025-06-19 PROCEDURE — 1036F TOBACCO NON-USER: CPT | Performed by: INTERNAL MEDICINE

## 2025-06-19 PROCEDURE — 82378 CARCINOEMBRYONIC ANTIGEN: CPT

## 2025-06-19 PROCEDURE — 36591 DRAW BLOOD OFF VENOUS DEVICE: CPT

## 2025-06-19 RX ORDER — HEPARIN 100 UNIT/ML
500 SYRINGE INTRAVENOUS AS NEEDED
OUTPATIENT
Start: 2025-06-19

## 2025-06-19 RX ORDER — HEPARIN SODIUM,PORCINE/PF 10 UNIT/ML
50 SYRINGE (ML) INTRAVENOUS AS NEEDED
OUTPATIENT
Start: 2025-06-19

## 2025-06-19 RX ORDER — HEPARIN 100 UNIT/ML
500 SYRINGE INTRAVENOUS AS NEEDED
Status: DISCONTINUED | OUTPATIENT
Start: 2025-06-19 | End: 2025-06-19 | Stop reason: HOSPADM

## 2025-06-19 RX ADMIN — HEPARIN 500 UNITS: 100 SYRINGE at 10:54

## 2025-06-19 ASSESSMENT — PAIN SCALES - GENERAL: PAINLEVEL_OUTOF10: 0-NO PAIN

## 2025-06-19 NOTE — PROGRESS NOTES
Patient ID: Elizabeth Wolff is a 29 y.o. female from [place], [place].     Referring Physician: Dagoberto Platt MD  [id number] [place] [address]    Primary Care Provider: No Assigned PCP Generic Provider, MD    Cancer History:          Colon and Rectum         AJCC Edition: 8th (AJCC), Diagnosis Date: Jun 2018, ALAN, T3 N1 M1a      Treatment Synopsis:    At 22-years of age she was  referred to [place] for evaluation of rectal bleeding for  1 month. . She went to emergency room with  a rectal exam was done and she was told she has small external hemorrhoids. She did have significant perianal discomfort. She had a flex/sig, with Dr. Lozano, on 6/29/18, that showed: A fungating non-obstructing medium-sized mass was found in the proximal  rectum. The mass was non-circumferential. The mass measured seven cm in length. No bleeding was present.   MRI described a lesion in the mid rectum with angela involvement suspected.   CTs done revealed pulmonary nodules with bx done 8/7/18 confirming metastatic disease.  MS- Stable   NGS  RESULT: Positive for KRAS mutation. Negative for NRAS mutation. Negative for BRAF mutation. VARIANTS DETECTED: KRAS p.G12D (c.35G>A) TP53 p.V272E (c.815T>A) INTERPRETATION: KRAS p.G12D Gain-of-function   FDA/NCCN RECOMMENDATION: Resistance to Panitumumab and Cetuximab CLINICAL TRIALS: Ribociclib + Trametinib ([id number]); Trametinib + Navitoclax ([id number])      CEA was done and was normal      FAMILY HISTORY  MGM with breast cancer in 60s  MGGF MA - colon cancer -- youngest age 56  Sister- bx pending for poss NHL.  Paternal history negative (poor knowledge base)  Distant maternal side with multiple cancers: pancreatic      She is heterozygous for Rad50 gene  *PALB2 Variant of Uncertain Significance (VUS)*:     Treatment History:          2018-8-15: Chemotherapy: FolFox Cycle #1     2018-11-30: Chemotherapy: Completion of 8 Folfox     2019-2-4: Chemotherapy: FolFox  restarted     2019-2-18: Chemotherapy: Treatment stopped per patient     2020-1-6: Chemotherapy: CapOx started with disease progression in lung     2020-1-13: Chemotherapy: CapOx stopped with poor tolerability     2020-2-6: Chemotherapy: Folfox started with oxaliplatin reaction noted     2020-4-2: Chemotherapy: Completion of 6 cycles Folfox       Past Medical History: Elizabeth has a past medical history of Colorectal cancer (Multi).  Surgical History:  Elizabeth has a past surgical history that includes Other surgical history (04/27/2020); Other surgical history (08/17/2020); Other surgical history (08/17/2020); and [place] tooth extraction.  Social History:  Elizabeth reports that she has never smoked. She has never been exposed to tobacco smoke. She has never used smokeless tobacco. She reports that she does not currently use alcohol. She reports that she does not use drugs.  Family History:    Family History   Problem Relation Name Age of Onset    Colon cancer Mother's Brother      Colon cancer Maternal Grandmother      Skin cancer Maternal Grandfather      Breast cancer Other Maternal Relatives     Colon cancer Other Maternal Relatives     Pancreatic cancer Other Maternal Relatives      Family Oncology History:  Cancer-related family history includes Breast cancer in an other family member; Colon cancer in her maternal grandmother, mother's brother, and another family member; Pancreatic cancer in an other family member; Skin cancer in her maternal grandfather.        SUBJECTIVE:    History of Present Illness:  Elizabeth Wolff is a 29 y.o. female who was referred by Dagoberto Platt MD and presents with No chief complaint on file.    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       OBJECTIVE:    VS / Pain:  There were no vitals taken for this visit.  BSA: There is no height or weight on file to calculate BSA.   Pain Scale: 0      Daily Weight  06/19/25 : 139 kg (305 lb 12.5 oz)  05/02/25 :  139 kg (307 lb)  04/25/25 : 139 kg (306 lb 11.2 oz)  04/03/25 : 139 kg (307 lb)  03/20/25 : 140 kg (307 lb 12.2 oz)        Physical Exam  Constitutional:       Appearance: Normal appearance.   HENT:      Nose: Nose normal.      Mouth/Throat:      Mouth: Mucous membranes are moist.   Eyes:      Extraocular Movements: Extraocular movements intact.      Conjunctiva/sclera: Conjunctivae normal.   Neck:      Comments: Skin over mediport is thin.  Hx of previous mediport erosion.  Cardiovascular:      Rate and Rhythm: Normal rate.   Musculoskeletal:         General: Normal range of motion.      Cervical back: Normal range of motion.   Skin:     General: Skin is warm.   Neurological:      General: No focal deficit present.       Performance Status:       Diagnostic Results     WBC   Date/Time Value Ref Range Status   04/25/2025 01:36 PM 9.9 4.4 - 11.3 x10*3/uL Final   03/20/2025 09:50 AM 8.1 4.4 - 11.3 x10*3/uL Final   04/16/2024 12:38 PM 8.5 4.4 - 11.3 x10*3/uL Final     Hemoglobin   Date Value Ref Range Status   04/25/2025 13.2 12.0 - 16.0 g/dL Final   03/20/2025 12.6 12.0 - 16.0 g/dL Final   04/16/2024 14.7 12.0 - 16.0 g/dL Final     MCV   Date/Time Value Ref Range Status   04/25/2025 01:36 PM 80 80 - 100 fL Final   03/20/2025 09:50 AM 81 80 - 100 fL Final   04/16/2024 12:38 PM 83 80 - 100 fL Final     Platelets   Date/Time Value Ref Range Status   04/25/2025 01:36  150 - 450 x10*3/uL Final   03/20/2025 09:50  150 - 450 x10*3/uL Final   04/16/2024 12:38  150 - 450 x10*3/uL Final     Neutrophils Absolute   Date/Time Value Ref Range Status   03/20/2025 09:50 AM 4.94 1.20 - 7.70 x10*3/uL Final     Comment:     Percent differential counts (%) should be interpreted in the context of the absolute cell counts (cells/uL).   04/16/2024 12:38 PM 4.90 1.20 - 7.70 x10*3/uL Final     Comment:     Percent differential counts (%) should be interpreted in the context of the absolute cell counts (cells/uL).  "  01/04/2024 12:58 PM 5.88 1.20 - 7.70 x10*3/uL Final     Comment:     Percent differential counts (%) should be interpreted in the context of the absolute cell counts (cells/uL).     Bilirubin, Total   Date/Time Value Ref Range Status   03/20/2025 09:50 AM 0.4 0.0 - 1.2 mg/dL Final   04/16/2024 12:38 PM 0.4 0.0 - 1.2 mg/dL Final   01/04/2024 12:58 PM 0.4 0.0 - 1.2 mg/dL Final     AST   Date/Time Value Ref Range Status   03/20/2025 09:50 AM 11 9 - 39 U/L Final   04/16/2024 12:38 PM 22 9 - 39 U/L Final     Comment:     MILD HEMOLYSIS DETECTED. The result may be falsely elevated due to hemolysis or other interferents. Clinical correlation is recommended. Repeat testing may be considered.   01/04/2024 12:58 PM 12 9 - 39 U/L Final     ALT   Date/Time Value Ref Range Status   03/20/2025 09:50 AM 11 7 - 45 U/L Final     Comment:     Patients treated with Sulfasalazine may generate falsely decreased results for ALT.   04/16/2024 12:38 PM 14 7 - 45 U/L Final     Comment:     Patients treated with Sulfasalazine may generate falsely decreased results for ALT.   01/04/2024 12:58 PM 11 7 - 45 U/L Final     Comment:     Patients treated with Sulfasalazine may generate falsely decreased results for ALT.     Creatinine   Date/Time Value Ref Range Status   04/25/2025 01:36 PM 0.57 0.50 - 1.05 mg/dL Final   03/20/2025 09:50 AM 0.67 0.50 - 1.05 mg/dL Final   01/24/2025 07:55 AM 0.54 0.50 - 1.05 mg/dL Final     Urea Nitrogen   Date/Time Value Ref Range Status   04/25/2025 01:36 PM 14 6 - 23 mg/dL Final   03/20/2025 09:50 AM 14 6 - 23 mg/dL Final   04/16/2024 12:38 PM 14 6 - 23 mg/dL Final     Albumin   Date/Time Value Ref Range Status   03/20/2025 09:50 AM 4.1 3.4 - 5.0 g/dL Final   04/16/2024 12:38 PM 4.3 3.4 - 5.0 g/dL Final   01/04/2024 12:58 PM 4.0 3.4 - 5.0 g/dL Final     No results found for: \"\"  Carcinoembryonic AG   Date/Time Value Ref Range Status   03/20/2025 09:50 AM <0.5 ug/L Final   04/16/2024 12:38 PM <0.5 ug/L " "Final   01/04/2024 12:58 PM <0.5 ug/L Final   === 06/17/25 ===    CT CHEST WO IV CONTRAST    - Impression -  1.  Stable postoperative changes of left upper lobectomy without evidence of locoregional disease recurrence. No new or enlarging discrete suspicious pulmonary nodules.  2. Stable size of a 0.6 cm nodular density within the left lower lobe.      MACRO:  None    Signed by: Hiram Mayer 6/18/2025 5:11 PM  Dictation workstation:   [id number]          Assessment/Plan   Assessment and Plan:   Assessment:    1. Stage IV rectal cancer:        presenting with lung nodules and a rectal mass. She had complete response after 9 infusions of FOLFOX with no rectal mass identified by endoscopy. November 2019, she had growth of tumor in the left upper lobe and chemo was restarted. She was intolerant  of CapOX but completed 6 infusions of FOLFOX. Of note, she had an oxaliplatin reaction and requires a prolonged infusion time.     She did have a bronchoscopy and thoracotomy on the left side in July 2020 after 15 rounds of chemotherapy.      2020 endoscopy showing only \"scar\" at site of prior tumor. No bx was taken. She is therefor PAT and i would strongly advocate for close attention to the primary tumor site as she is most at risk for local recurrence.      Of note, imaging for her is likely to be complicated as she has had significant reactions to CT dye even with the addition of premeds. She described her last reaction as just feeling \"tingly\"   She also has a history of a reaction to MRI dye though not premedicated. MRI event-- respiratory with throat symptoms.     Recall she is SHERIDAN, Kras mutated (NRAS unmutated) and noted to have RAD50 gene.  Discussed clonoseq type testing vs Signatera to evaluate for any occult evidence of circulating tumor. Will follow with current testing and with any abnormalities this will be done     MRI rectum in 2/2023 was negative.  Flex sig negative in 2/2023      Plan:   Rectal " cancer:  PAT  Colonoscopy 5/2025 negative.    - Negative Signatera [id number], repeat Q 3 months x 2 years.  - Continue port flushes at [facility] for port removal.      2. Mediport care:  Ongoing care. Some flow issue noted.      3. Premature menopausal symptoms:   She has a Mirena in place and now without regular menstrual cycles. Some concern about her breast cancer risk with RAD50 gene. Will be attentive to yearly screening. Has follow up yearly with GYN in [place]     4. Obesity:  This is an ongoing issue and somewhat limits exam. May also put her at increased risk from cancer perspective.        5. Genetics  Rad50 mutation noted-- may predict risk for Colon, Breast and ovarian.    Dagoberto Platt MD

## 2025-06-26 ENCOUNTER — HOSPITAL ENCOUNTER (OUTPATIENT)
Dept: RADIOLOGY | Facility: HOSPITAL | Age: 30
Discharge: HOME | End: 2025-06-26
Payer: COMMERCIAL

## 2025-06-26 VITALS
OXYGEN SATURATION: 98 % | TEMPERATURE: 96.4 F | HEART RATE: 72 BPM | RESPIRATION RATE: 16 BRPM | SYSTOLIC BLOOD PRESSURE: 115 MMHG | DIASTOLIC BLOOD PRESSURE: 80 MMHG

## 2025-06-26 DIAGNOSIS — C20 PRIMARY MALIGNANT NEOPLASM OF RECTUM (MULTI): ICD-10-CM

## 2025-06-26 PROCEDURE — 7100000010 HC PHASE TWO TIME - EACH INCREMENTAL 1 MINUTE

## 2025-06-26 PROCEDURE — 99152 MOD SED SAME PHYS/QHP 5/>YRS: CPT | Performed by: RADIOLOGY

## 2025-06-26 PROCEDURE — 2500000004 HC RX 250 GENERAL PHARMACY W/ HCPCS (ALT 636 FOR OP/ED): Performed by: RADIOLOGY

## 2025-06-26 PROCEDURE — 36590 REMOVAL TUNNELED CV CATH: CPT | Performed by: RADIOLOGY

## 2025-06-26 PROCEDURE — 99152 MOD SED SAME PHYS/QHP 5/>YRS: CPT

## 2025-06-26 PROCEDURE — 7100000009 HC PHASE TWO TIME - INITIAL BASE CHARGE

## 2025-06-26 RX ORDER — FENTANYL CITRATE 50 UG/ML
INJECTION, SOLUTION INTRAMUSCULAR; INTRAVENOUS
Status: COMPLETED | OUTPATIENT
Start: 2025-06-26 | End: 2025-06-26

## 2025-06-26 RX ORDER — MIDAZOLAM HYDROCHLORIDE 1 MG/ML
INJECTION INTRAMUSCULAR; INTRAVENOUS
Status: COMPLETED | OUTPATIENT
Start: 2025-06-26 | End: 2025-06-26

## 2025-06-26 RX ADMIN — FENTANYL CITRATE 50 MCG: 50 INJECTION, SOLUTION INTRAMUSCULAR; INTRAVENOUS at 13:24

## 2025-06-26 RX ADMIN — MIDAZOLAM HYDROCHLORIDE 1 MG: 2 INJECTION, SOLUTION INTRAMUSCULAR; INTRAVENOUS at 13:24

## 2025-06-26 ASSESSMENT — PAIN SCALES - GENERAL
PAINLEVEL_OUTOF10: 0 - NO PAIN

## 2025-06-26 ASSESSMENT — PAIN - FUNCTIONAL ASSESSMENT
PAIN_FUNCTIONAL_ASSESSMENT: 0-10

## 2025-06-26 NOTE — PRE-PROCEDURE NOTE
Interventional Radiology Preprocedure Note    Indication for procedure: The encounter diagnosis was Primary malignant neoplasm of rectum (Multi).    Relevant review of systems: NA    Relevant Labs:   Lab Results   Component Value Date    CREATININE 0.58 06/19/2025    EGFR >90 06/19/2025    INR 1.1 11/01/2023    PROTIME 12.3 11/01/2023       Planned Sedation/Anesthesia: Moderate    Airway assessment: normal    Directed physical examination:    Aox3  Normal rate of respirations  Neck is without erythema or swelling  Mediport in place    Mallampati: II (hard and soft palate, upper portion of tonsils and uvula visible)    ASA Score: ASA 2 - Patient with mild systemic disease with no functional limitations    Benefits, risks and alternatives of procedure and planned sedation have been discussed with the patient and/or their representative. All questions answered and they agree to proceed.    Needs to come in to have EKG.  Discussed with NP and will have her see him to confirm no EKG changes.     Thank you,  Gaurav

## 2025-06-26 NOTE — Clinical Note
1mg versed and 50mcg fent given. RSC mediport removal. Dressing CDI. PT stable throughout procedure. Report given to MASTER RN.

## 2025-06-26 NOTE — POST-PROCEDURE NOTE
Interventional Radiology Brief Postprocedure Note    Attending: Milad Huertas MD     Performed: Perez Ferris MD    Diagnosis: Mediport     Description of procedure: Technically successful and uncomplicated Mediport removal.      Anesthesia:  Local and MAC Moderate    Complications: None    Estimated Blood Loss: minimal    Medications (Filter: Administrations occurring from 1313 to 1347 on 06/26/25) As of 06/26/25 1347      midazolam (Versed) injection (mg) Total dose:  1 mg      Date/Time Rate/Dose/Volume Action       06/26/25  1324 1 mg Given               fentaNYL PF (Sublimaze) injection (mcg) Total dose:  50 mcg      Date/Time Rate/Dose/Volume Action       06/26/25  1324 50 mcg Given                       See detailed result report with images in PACS.    The patient tolerated the procedure well without incident or complication and is in stable condition.

## 2025-07-08 LAB
COMMENTS - MP RESULT TYPE: NORMAL
SCAN RESULT: NORMAL

## 2025-12-03 ENCOUNTER — APPOINTMENT (OUTPATIENT)
Dept: GYNECOLOGIC ONCOLOGY | Facility: CLINIC | Age: 30
End: 2025-12-03
Payer: COMMERCIAL